# Patient Record
Sex: FEMALE | Race: WHITE | Employment: OTHER | URBAN - METROPOLITAN AREA
[De-identification: names, ages, dates, MRNs, and addresses within clinical notes are randomized per-mention and may not be internally consistent; named-entity substitution may affect disease eponyms.]

---

## 2021-11-13 DIAGNOSIS — M25.562 LEFT KNEE PAIN: Primary | ICD-10-CM

## 2021-11-17 DIAGNOSIS — M25.562 LEFT KNEE PAIN: ICD-10-CM

## 2021-11-19 ENCOUNTER — OFFICE VISIT (OUTPATIENT)
Dept: ORTHOPEDIC SURGERY | Age: 71
End: 2021-11-19
Payer: MEDICARE

## 2021-11-19 VITALS — HEIGHT: 62 IN | BODY MASS INDEX: 30.36 KG/M2 | WEIGHT: 165 LBS

## 2021-11-19 DIAGNOSIS — M22.42 PATELLA, CHONDROMALACIA, LEFT: ICD-10-CM

## 2021-11-19 DIAGNOSIS — M94.262 CHONDROMALACIA OF LEFT KNEE: ICD-10-CM

## 2021-11-19 DIAGNOSIS — M23.42 LOOSE BODY OF LEFT KNEE: ICD-10-CM

## 2021-11-19 DIAGNOSIS — M25.562 CHRONIC PAIN OF LEFT KNEE: Primary | ICD-10-CM

## 2021-11-19 DIAGNOSIS — M17.12 PRIMARY LOCALIZED OSTEOARTHRITIS OF LEFT KNEE: ICD-10-CM

## 2021-11-19 DIAGNOSIS — G89.29 CHRONIC PAIN OF LEFT KNEE: Primary | ICD-10-CM

## 2021-11-19 PROCEDURE — G8400 PT W/DXA NO RESULTS DOC: HCPCS | Performed by: ORTHOPAEDIC SURGERY

## 2021-11-19 PROCEDURE — G8432 DEP SCR NOT DOC, RNG: HCPCS | Performed by: ORTHOPAEDIC SURGERY

## 2021-11-19 PROCEDURE — G8419 CALC BMI OUT NRM PARAM NOF/U: HCPCS | Performed by: ORTHOPAEDIC SURGERY

## 2021-11-19 PROCEDURE — G8536 NO DOC ELDER MAL SCRN: HCPCS | Performed by: ORTHOPAEDIC SURGERY

## 2021-11-19 PROCEDURE — 1090F PRES/ABSN URINE INCON ASSESS: CPT | Performed by: ORTHOPAEDIC SURGERY

## 2021-11-19 PROCEDURE — 99213 OFFICE O/P EST LOW 20 MIN: CPT | Performed by: ORTHOPAEDIC SURGERY

## 2021-11-19 PROCEDURE — 1101F PT FALLS ASSESS-DOCD LE1/YR: CPT | Performed by: ORTHOPAEDIC SURGERY

## 2021-11-19 PROCEDURE — 3017F COLORECTAL CA SCREEN DOC REV: CPT | Performed by: ORTHOPAEDIC SURGERY

## 2021-11-19 PROCEDURE — G8428 CUR MEDS NOT DOCUMENT: HCPCS | Performed by: ORTHOPAEDIC SURGERY

## 2021-11-19 NOTE — PROGRESS NOTES
Nava Obregon (: 1950) is a 79 y.o. female, patient, here for evaluation of the following chief complaint(s):  Knee Pain (left)       HPI:    She was last seen for her left knee pain on 2021. Since then, the patient did have an MRI performed of her left knee at an outside facility. She states that her pain level is the same as was her last visit. She rates the severity of her left knee pain is a 5 out of 10. She describes her pain is dull, aching, and intermittent. Her left knee pain does make it difficult for her to go to sleep and does wake her up from sleep. The patient has been attending formal physical therapy for left knee pain. She has also been working on these exercises with an at-home exercise program.  She has received previous cortisone injection in her left knee in August.    Left knee MRI results:  Evidence of a heterogeneous medial meniscal flap tear and mild extrusion. Moderate-severe chondral loss from the patella and medial femoral condyle. Small joint effusion. 5 mm anterior loose body is lateral to midline. Intact cruciates and collaterals. Not on File    No current outpatient medications on file. No current facility-administered medications for this visit. No past medical history on file. No past surgical history on file. No family history on file.      Social History     Socioeconomic History    Marital status:      Spouse name: Not on file    Number of children: Not on file    Years of education: Not on file    Highest education level: Not on file   Occupational History    Not on file   Tobacco Use    Smoking status: Not on file    Smokeless tobacco: Not on file   Substance and Sexual Activity    Alcohol use: Not on file    Drug use: Not on file    Sexual activity: Not on file   Other Topics Concern    Not on file   Social History Narrative    Not on file     Social Determinants of Health     Financial Resource Strain:    [address] [place] [address] Difficulty of Paying Living Expenses: Not on file   Food Insecurity:     Worried About Running Out of Food in the Last Year: Not on file    Ran Out of Food in the Last Year: Not on file   Transportation Needs:     Lack of Transportation (Medical): Not on file    Lack of Transportation (Non-Medical): Not on file   Physical Activity:     Days of Exercise per Week: Not on file    Minutes of Exercise per Session: Not on file   Stress:     Feeling of Stress : Not on file   Social Connections:     Frequency of Communication with Friends and Family: Not on file    Frequency of Social Gatherings with Friends and Family: Not on file    Attends Druze Services: Not on file    Active Member of [address] [place] [address] or Organizations: Not on file    Attends Club or Organization Meetings: Not on file    Marital Status: Not on file   Intimate Partner Violence:     Fear of Current or Ex-Partner: Not on file    Emotionally Abused: Not on file    Physically Abused: Not on file    Sexually Abused: Not on file   Housing Stability:     Unable to Pay for Housing in the Last Year: Not on file    Number of [place] in the Last Year: Not on file    Unstable Housing in the Last Year: Not on file       Review of Systems   All other systems reviewed and are negative. Vitals:  Ht 5' 2\" (1.575 m)   Wt 165 lb (74.8 kg)   BMI 30.18 kg/m²    Body mass index is 30.18 kg/m². Ortho Exam     Upon physical examination, the patient is well developed, well nourished, alert and oriented times three, with normal mood and affect and walks with a slightly antalgic gait because of her left knee pain. Left knee, the patient is tender to palpation along the medial joint line, and has an effusion. They are nontender to palpation along the medial and lateral facets of the patella. The patient have discomfort with Hazel's maneuvers, and the knee is stable. They have limited range of motion.  They have 5/5 strength, and are neurovascularly intact distally. There is no erythema, warmth or skin lesions present. ASSESSMENT/PLAN:      1. Chronic pain of left knee  2. Primary localized osteoarthritis of left knee  3. Patella, chondromalacia, left  4. Chondromalacia of left knee  5. Loose body of left knee       Below is the assessment and plan developed based on review of pertinent history, physical exam, labs, studies, and medications. We discussed the patient's ongoing left knee pain and we reviewed her MRI images and results and they were consistent with evidence of a heterogeneous medial meniscal flap tear and mild extrusion. Moderate-severe chondral loss from the patella and medial femoral condyle. Small joint effusion. 5 mm anterior loose body is lateral to midline. Intact cruciates and collaterals. The possible treatment options were discussed with the patient and because her pain is secondary to her osteoarthritis she would be an excellent candidate for viscosupplementation injections. The patient was given information on the risks and benefits of the injections and she would like to proceed. We will start the approval process to obtain the injections and contact the patient when her injections arrive. In the interim, she will continue to ice and elevate when possible, modify her activity level based on her left knee pain, and use anti-inflammatory medication when necessary. Patient will also continue to work on range of motion, strengthening, and stretching exercises with an at-home exercise program as pain tolerates. She is to avoid any deep knee bend activities against resistance, squatting, kneeling, stairs, lunging, cutting, twisting, change in direction, and high impact loading activities. I will see her back on an as-needed basis for her left knee pain or as noted above when her injections were derived. Return if symptoms worsen or fail to improve, for When her viscosupplementation injections arrive. .    An electronic signature was used to authenticate this note.   -- Kerry Brain

## 2021-12-03 ENCOUNTER — OFFICE VISIT (OUTPATIENT)
Dept: ORTHOPEDIC SURGERY | Age: 71
End: 2021-12-03
Payer: MEDICARE

## 2021-12-03 VITALS — HEIGHT: 62 IN | BODY MASS INDEX: 29.81 KG/M2 | WEIGHT: 162 LBS

## 2021-12-03 DIAGNOSIS — G89.29 CHRONIC PAIN OF LEFT KNEE: Primary | ICD-10-CM

## 2021-12-03 DIAGNOSIS — M22.42 PATELLA, CHONDROMALACIA, LEFT: ICD-10-CM

## 2021-12-03 DIAGNOSIS — M25.562 CHRONIC PAIN OF LEFT KNEE: Primary | ICD-10-CM

## 2021-12-03 DIAGNOSIS — M23.42 LOOSE BODY OF LEFT KNEE: ICD-10-CM

## 2021-12-03 DIAGNOSIS — M94.262 CHONDROMALACIA OF LEFT KNEE: ICD-10-CM

## 2021-12-03 DIAGNOSIS — M17.12 PRIMARY LOCALIZED OSTEOARTHRITIS OF LEFT KNEE: ICD-10-CM

## 2021-12-03 PROCEDURE — 1101F PT FALLS ASSESS-DOCD LE1/YR: CPT | Performed by: ORTHOPAEDIC SURGERY

## 2021-12-03 PROCEDURE — G8419 CALC BMI OUT NRM PARAM NOF/U: HCPCS | Performed by: ORTHOPAEDIC SURGERY

## 2021-12-03 PROCEDURE — 3017F COLORECTAL CA SCREEN DOC REV: CPT | Performed by: ORTHOPAEDIC SURGERY

## 2021-12-03 PROCEDURE — G8400 PT W/DXA NO RESULTS DOC: HCPCS | Performed by: ORTHOPAEDIC SURGERY

## 2021-12-03 PROCEDURE — 99214 OFFICE O/P EST MOD 30 MIN: CPT | Performed by: ORTHOPAEDIC SURGERY

## 2021-12-03 PROCEDURE — 1090F PRES/ABSN URINE INCON ASSESS: CPT | Performed by: ORTHOPAEDIC SURGERY

## 2021-12-03 PROCEDURE — G8432 DEP SCR NOT DOC, RNG: HCPCS | Performed by: ORTHOPAEDIC SURGERY

## 2021-12-03 PROCEDURE — 20610 DRAIN/INJ JOINT/BURSA W/O US: CPT | Performed by: ORTHOPAEDIC SURGERY

## 2021-12-03 PROCEDURE — G8536 NO DOC ELDER MAL SCRN: HCPCS | Performed by: ORTHOPAEDIC SURGERY

## 2021-12-03 PROCEDURE — G8427 DOCREV CUR MEDS BY ELIG CLIN: HCPCS | Performed by: ORTHOPAEDIC SURGERY

## 2022-01-07 ENCOUNTER — OFFICE VISIT (OUTPATIENT)
Dept: ORTHOPEDIC SURGERY | Age: 72
End: 2022-01-07
Payer: MEDICARE

## 2022-01-07 VITALS — BODY MASS INDEX: 30.36 KG/M2 | HEIGHT: 62 IN | WEIGHT: 165 LBS

## 2022-01-07 DIAGNOSIS — M94.262 CHONDROMALACIA OF LEFT KNEE: ICD-10-CM

## 2022-01-07 DIAGNOSIS — M25.562 CHRONIC PAIN OF LEFT KNEE: Primary | ICD-10-CM

## 2022-01-07 DIAGNOSIS — M17.12 PRIMARY LOCALIZED OSTEOARTHRITIS OF LEFT KNEE: ICD-10-CM

## 2022-01-07 DIAGNOSIS — M23.42 LOOSE BODY OF LEFT KNEE: ICD-10-CM

## 2022-01-07 DIAGNOSIS — G89.29 CHRONIC PAIN OF LEFT KNEE: Primary | ICD-10-CM

## 2022-01-07 DIAGNOSIS — M22.42 PATELLA, CHONDROMALACIA, LEFT: ICD-10-CM

## 2022-01-07 PROCEDURE — 3017F COLORECTAL CA SCREEN DOC REV: CPT | Performed by: ORTHOPAEDIC SURGERY

## 2022-01-07 PROCEDURE — G8417 CALC BMI ABV UP PARAM F/U: HCPCS | Performed by: ORTHOPAEDIC SURGERY

## 2022-01-07 PROCEDURE — 99214 OFFICE O/P EST MOD 30 MIN: CPT | Performed by: ORTHOPAEDIC SURGERY

## 2022-01-07 PROCEDURE — G8427 DOCREV CUR MEDS BY ELIG CLIN: HCPCS | Performed by: ORTHOPAEDIC SURGERY

## 2022-01-07 PROCEDURE — G8400 PT W/DXA NO RESULTS DOC: HCPCS | Performed by: ORTHOPAEDIC SURGERY

## 2022-01-07 PROCEDURE — 1090F PRES/ABSN URINE INCON ASSESS: CPT | Performed by: ORTHOPAEDIC SURGERY

## 2022-01-07 PROCEDURE — G8536 NO DOC ELDER MAL SCRN: HCPCS | Performed by: ORTHOPAEDIC SURGERY

## 2022-01-07 PROCEDURE — 1101F PT FALLS ASSESS-DOCD LE1/YR: CPT | Performed by: ORTHOPAEDIC SURGERY

## 2022-01-07 PROCEDURE — G8432 DEP SCR NOT DOC, RNG: HCPCS | Performed by: ORTHOPAEDIC SURGERY

## 2022-01-07 NOTE — PROGRESS NOTES
Jaime Hamlin (: 1950) is a 70 y.o. female, patient, here for evaluation of the following chief complaint(s):  Knee Pain (left)       HPI:    She last seen for left knee pain on 12/3/2021. The patient states that her pain level is the same as it was at her last visit. She rates the severity of her left knee pain as a 5 or 6 out of 10. She describes her pain is aching and intermittent. Her left knee pain does occasionally make it difficult for her to go to sleep and does wake her up from sleep. She has been taking Advil for her discomfort as needed. She did have her knee injected with viscosupplementation at her last visit. She did also have an MRI performed on her left knee prior to her last visit which was again reviewed today. Left knee MRI results:  Evidence of a heterogeneous medial meniscal flap tear and mild extrusion. Moderate-severe chondral loss from the patella and medial femoral condyle. Small joint effusion. 5 mm anterior loose body is lateral to midline. Intact cruciates and collaterals. Not on File    No current outpatient medications on file. No current facility-administered medications for this visit. History reviewed. No pertinent past medical history. History reviewed. No pertinent surgical history. History reviewed. No pertinent family history.      Social History     Socioeconomic History    Marital status:      Spouse name: Not on file    Number of children: Not on file    Years of education: Not on file    Highest education level: Not on file   Occupational History    Not on file   Tobacco Use    Smoking status: Not on file    Smokeless tobacco: Not on file   Substance and Sexual Activity    Alcohol use: Not on file    Drug use: Not on file    Sexual activity: Not on file   Other Topics Concern    Not on file   Social History Narrative    Not on file     Social Determinants of Health     Financial Resource Strain:     Difficulty of Paying Living Expenses: Not on file   Food Insecurity:     Worried About Running Out of Food in the Last Year: Not on file    Ran Out of Food in the Last Year: Not on file   Transportation Needs:     Lack of Transportation (Medical): Not on file    Lack of Transportation (Non-Medical): Not on file   Physical Activity:     Days of Exercise per Week: Not on file    Minutes of Exercise per Session: Not on file   Stress:     Feeling of Stress : Not on file   Social Connections:     Frequency of Communication with Friends and Family: Not on file    Frequency of Social Gatherings with Friends and Family: Not on file    Attends Church Services: Not on file    Active Member of [address] [place] [address] [place] or Organizations: Not on file    Attends Club or Organization Meetings: Not on file    Marital Status: Not on file   Intimate Partner Violence:     Fear of Current or Ex-Partner: Not on file    Emotionally Abused: Not on file    Physically Abused: Not on file    Sexually Abused: Not on file   Housing Stability:     Unable to Pay for Housing in the Last Year: Not on file    Number of [place] in the Last Year: Not on file    Unstable Housing in the Last Year: Not on file       Review of Systems   All other systems reviewed and are negative. Vitals:  Ht 5' 2\" (1.575 m)   Wt 165 lb (74.8 kg)   BMI 30.18 kg/m²    Body mass index is 30.18 kg/m². Ortho Exam     The patient is well-developed and well-nourished. The patient presents today in alert and oriented x3 with a normal mood and affect. The patient stands with a normal weightbearing line but walks with a slightly antalgic gait because of her left knee pain.     Left knee, the patient is tender to palpation along the medial joint line, and has an effusion. She is also tender to palpation along the medial and lateral facets of the patella. The patient have discomfort with Hazel's maneuvers, and the knee is stable. They have limited range of motion.  They have 5/5 strength, and are neurovascularly intact distally. There is no erythema, warmth or skin lesions present. ASSESSMENT/PLAN:      1. Chronic pain of left knee  2. Primary localized osteoarthritis of left knee  3. Patella, chondromalacia, left  4. Chondromalacia of left knee  5. Loose body of left knee       Below is the assessment and plan developed based on review of pertinent history, physical exam, labs, studies, and medications. We discussed the patient's ongoing left knee pain and we again reviewed her MRI images and results and they were consistent with evidence of a heterogeneous medial meniscal flap tear and mild extrusion. Moderate-severe chondral loss from the patella and medial femoral condyle. Small joint effusion. 5 mm anterior loose body is lateral to midline. Intact cruciates and collaterals. The possible treatment options were discussed with the patient and because of the duration of her increased pain, no improvement with multiple modalities of conservative management including an at-home exercise program and previous viscosupplementation injection, her physical exam, past x-rays, MRI images and results, and her inability to complete daily living activities without significant discomfort we both decided that surgical intervention was the best treatment plan. The risks and benefits of a left knee arthroscopic exam with chondroplasty and removal of loose body were discussed in detail with the patient and she would like to proceed. We will schedule this at her convenience. In the interim, I did encourage her to ice and elevate when possible, modify her activity level based on her left knee pain, and use anti-inflammatory medication when necessary. The patient will also work on range of motion, strengthening, and stretching exercises with an at-home exercise program as pain tolerates.   She is to avoid any deep knee bend activities against resistance, squatting, kneeling, stairs, lunging, cutting, twisting, change of direction, and high impact loading activities. I will see her back in the office on an as-needed basis or on the day of her left knee surgery. Return for In the office as needed or on the day of her left knee surgery. An electronic signature was used to authenticate this note.   -- Cristal Encinas MD

## 2022-07-25 ENCOUNTER — HOSPITAL ENCOUNTER (EMERGENCY)
Age: 72
Discharge: HOME | End: 2022-07-25
Attending: STUDENT IN AN ORGANIZED HEALTH CARE EDUCATION/TRAINING PROGRAM
Payer: MEDICARE

## 2022-07-25 VITALS
RESPIRATION RATE: 16 BRPM | TEMPERATURE: 97.8 F | HEART RATE: 78 BPM | OXYGEN SATURATION: 98 % | DIASTOLIC BLOOD PRESSURE: 80 MMHG | SYSTOLIC BLOOD PRESSURE: 140 MMHG

## 2022-07-25 DIAGNOSIS — L25.8 CONTACT DERMATITIS DUE TO OTHER AGENT, UNSPECIFIED CONTACT DERMATITIS TYPE: Primary | ICD-10-CM

## 2022-07-25 PROCEDURE — 99283 EMERGENCY DEPT VISIT LOW MDM: CPT

## 2022-07-25 RX ORDER — BETAMETHASONE DIPROPIONATE 0.5 MG/G
CREAM TOPICAL
COMMUNITY
Start: 2022-07-21

## 2022-07-25 RX ORDER — PREDNISONE 10 MG/1
TABLET ORAL
Qty: 48 TABLET | Refills: 0 | Status: SHIPPED | OUTPATIENT
Start: 2022-07-25

## 2022-07-25 RX ORDER — RAMIPRIL 10 MG/1
CAPSULE ORAL
COMMUNITY
Start: 2022-07-13

## 2022-07-25 RX ORDER — CHLORTHALIDONE 25 MG/1
TABLET ORAL
COMMUNITY
Start: 2022-02-01

## 2022-07-25 NOTE — ED TRIAGE NOTES
Patient arrives ambulatory to ed with complaints of rash since last thursday. Rash started on the back of her neck and now has spread all over her body. Patient states she gets poison ivy ever year, however never this bad. Patient has been using betamethasone cream, hydrocortisone cream and benadryl without relief.

## 2022-07-26 NOTE — ED PROVIDER NOTES
66-year-old female presents today with a rash. It started after she worked in her yard weeding. It started on the back of her neck and is since become diffuse. No lesions within the mouth, eye irritation or involvement of the palms or soles of her hands and feet. She tried topical steroids without improvement. Symptoms have been going on for several days now and not improving. She is allergic to poison ivy and poison oak. No other symptoms such as difficulty breathing, trouble swallowing, fevers. Past Medical History:   Diagnosis Date    Hypertension        History reviewed. No pertinent surgical history. History reviewed. No pertinent family history. Social History     Socioeconomic History    Marital status:      Spouse name: Not on file    Number of children: Not on file    Years of education: Not on file    Highest education level: Not on file   Occupational History    Not on file   Tobacco Use    Smoking status: Never    Smokeless tobacco: Never   Substance and Sexual Activity    Alcohol use: Not Currently    Drug use: Never    Sexual activity: Not on file   Other Topics Concern    Not on file   Social History Narrative    Not on file     Social Determinants of Health     Financial Resource Strain: Not on file   Food Insecurity: Not on file   Transportation Needs: Not on file   Physical Activity: Not on file   Stress: Not on file   Social Connections: Not on file   Intimate Partner Violence: Not on file   Housing Stability: Not on file         ALLERGIES: Sporanox [itraconazole]    Review of Systems   Constitutional:  Negative for chills and fever. HENT:  Negative for congestion and rhinorrhea. Eyes:  Negative for redness and visual disturbance. Respiratory:  Negative for cough and shortness of breath. Cardiovascular:  Negative for chest pain and leg swelling. Gastrointestinal:  Negative for abdominal pain, diarrhea, nausea and vomiting.    Genitourinary:  Negative for dysuria, flank pain, frequency, hematuria and urgency. Musculoskeletal:  Negative for arthralgias, back pain, myalgias and neck pain. Skin:  Positive for rash. Negative for wound. Allergic/Immunologic: Negative for immunocompromised state. Neurological:  Negative for dizziness and headaches. Vitals:    07/25/22 1525 07/25/22 1555   BP: 137/83 (!) 140/80   Pulse: 78    Resp: 16    Temp: 97.8 °F (36.6 °C)    SpO2: 100% 98%            Physical Exam  Constitutional:       General: She is not in acute distress. Appearance: She is well-developed. HENT:      Head: Normocephalic. Eyes:      Conjunctiva/sclera: Conjunctivae normal.   Pulmonary:      Effort: Pulmonary effort is normal. No respiratory distress. Musculoskeletal:         General: Normal range of motion. Cervical back: Normal range of motion. Skin:     General: Skin is warm and dry. Capillary Refill: Capillary refill takes less than 2 seconds. Comments: Scattered rash to the back of the neck, torso, arms consistent with a contact dermatitis. Lesions have started to coalesce on the bilateral flanks. No drainage. Psychiatric:         Behavior: Behavior normal.        MDM  61-year-old female presents today with what appears to be a contact dermatitis, likely related to poison ivy or poison oak as it started after working in the yard. Has failed topical steroids and Benadryl. I will prescribe her oral prednisone given the diffuse nature of the rash and the duration of symptoms. No evidence of cellulitis at this time. She is afebrile and in no acute distress. She is okay for discharge home with strict return precautions. ICD-10-CM ICD-9-CM    1.  Contact dermatitis due to other agent, unspecified contact dermatitis type  L25.8 692.89         CRISTINE Kimball, DO           Procedures

## 2023-03-05 ENCOUNTER — HOSPITAL ENCOUNTER (INPATIENT)
Age: 73
LOS: 1 days | Discharge: HOME | DRG: 069 | End: 2023-03-06
Attending: EMERGENCY MEDICINE | Admitting: FAMILY MEDICINE
Payer: MEDICARE

## 2023-03-05 ENCOUNTER — APPOINTMENT (OUTPATIENT)
Dept: CT IMAGING | Age: 73
DRG: 069 | End: 2023-03-05
Attending: EMERGENCY MEDICINE
Payer: MEDICARE

## 2023-03-05 DIAGNOSIS — R94.31 PROLONGED Q-T INTERVAL ON ECG: ICD-10-CM

## 2023-03-05 DIAGNOSIS — R00.1 SYMPTOMATIC BRADYCARDIA: Primary | ICD-10-CM

## 2023-03-05 DIAGNOSIS — R42 DIZZINESS: ICD-10-CM

## 2023-03-05 DIAGNOSIS — R77.8 ELEVATED TROPONIN: ICD-10-CM

## 2023-03-05 LAB
ANION GAP SERPL CALC-SCNC: 9 MMOL/L (ref 5–15)
ATRIAL RATE: 59 BPM
BASOPHILS # BLD: 0.1 K/UL (ref 0–0.1)
BASOPHILS NFR BLD: 1 % (ref 0–1)
BUN SERPL-MCNC: 23 MG/DL (ref 6–20)
BUN/CREAT SERPL: 32 (ref 12–20)
CALCIUM SERPL-MCNC: 8.8 MG/DL (ref 8.5–10.1)
CALCULATED P AXIS, ECG09: 72 DEGREES
CALCULATED R AXIS, ECG10: -5 DEGREES
CALCULATED T AXIS, ECG11: -22 DEGREES
CHLORIDE SERPL-SCNC: 107 MMOL/L (ref 97–108)
CO2 SERPL-SCNC: 29 MMOL/L (ref 21–32)
CREAT SERPL-MCNC: 0.73 MG/DL (ref 0.55–1.02)
DIAGNOSIS, 93000: NORMAL
DIFFERENTIAL METHOD BLD: ABNORMAL
EOSINOPHIL # BLD: 0.3 K/UL (ref 0–0.4)
EOSINOPHIL NFR BLD: 3 % (ref 0–7)
ERYTHROCYTE [DISTWIDTH] IN BLOOD BY AUTOMATED COUNT: 17.9 % (ref 11.5–14.5)
GLUCOSE SERPL-MCNC: 128 MG/DL (ref 65–100)
HCT VFR BLD AUTO: 35.3 % (ref 35–47)
HGB BLD-MCNC: 10.7 G/DL (ref 11.5–16)
IMM GRANULOCYTES # BLD AUTO: 0.1 K/UL (ref 0–0.04)
IMM GRANULOCYTES NFR BLD AUTO: 1 % (ref 0–0.5)
LYMPHOCYTES # BLD: 1.6 K/UL (ref 0.8–3.5)
LYMPHOCYTES NFR BLD: 19 % (ref 12–49)
MCH RBC QN AUTO: 18.6 PG (ref 26–34)
MCHC RBC AUTO-ENTMCNC: 30.3 G/DL (ref 30–36.5)
MCV RBC AUTO: 61.4 FL (ref 80–99)
MONOCYTES # BLD: 0.7 K/UL (ref 0–1)
MONOCYTES NFR BLD: 8 % (ref 5–13)
NEUTS SEG # BLD: 5.5 K/UL (ref 1.8–8)
NEUTS SEG NFR BLD: 68 % (ref 32–75)
NRBC # BLD: 0 K/UL (ref 0–0.01)
NRBC BLD-RTO: 0 PER 100 WBC
P-R INTERVAL, ECG05: 146 MS
PLATELET # BLD AUTO: 223 K/UL (ref 150–400)
POTASSIUM SERPL-SCNC: 3.5 MMOL/L (ref 3.5–5.1)
Q-T INTERVAL, ECG07: 552 MS
QRS DURATION, ECG06: 134 MS
QTC CALCULATION (BEZET), ECG08: 546 MS
RBC # BLD AUTO: 5.75 M/UL (ref 3.8–5.2)
RBC MORPH BLD: ABNORMAL
RBC MORPH BLD: ABNORMAL
SODIUM SERPL-SCNC: 145 MMOL/L (ref 136–145)
TROPONIN I SERPL HS-MCNC: 112 NG/L (ref 0–51)
VENTRICULAR RATE, ECG03: 59 BPM
WBC # BLD AUTO: 8.3 K/UL (ref 3.6–11)

## 2023-03-05 PROCEDURE — 36415 COLL VENOUS BLD VENIPUNCTURE: CPT

## 2023-03-05 PROCEDURE — 74011000636 HC RX REV CODE- 636: Performed by: EMERGENCY MEDICINE

## 2023-03-05 PROCEDURE — 70450 CT HEAD/BRAIN W/O DYE: CPT

## 2023-03-05 PROCEDURE — 74011250637 HC RX REV CODE- 250/637: Performed by: EMERGENCY MEDICINE

## 2023-03-05 PROCEDURE — 99285 EMERGENCY DEPT VISIT HI MDM: CPT

## 2023-03-05 PROCEDURE — 84484 ASSAY OF TROPONIN QUANT: CPT

## 2023-03-05 PROCEDURE — 93005 ELECTROCARDIOGRAM TRACING: CPT

## 2023-03-05 PROCEDURE — 70496 CT ANGIOGRAPHY HEAD: CPT

## 2023-03-05 PROCEDURE — 65270000046 HC RM TELEMETRY

## 2023-03-05 PROCEDURE — 80048 BASIC METABOLIC PNL TOTAL CA: CPT

## 2023-03-05 PROCEDURE — 85025 COMPLETE CBC W/AUTO DIFF WBC: CPT

## 2023-03-05 RX ORDER — LISINOPRIL 20 MG/1
40 TABLET ORAL DAILY
Status: DISCONTINUED | OUTPATIENT
Start: 2023-03-06 | End: 2023-03-06 | Stop reason: HOSPADM

## 2023-03-05 RX ORDER — CALCIUM CARBONATE/VITAMIN D3 600 MG-125
TABLET ORAL
COMMUNITY

## 2023-03-05 RX ORDER — ACETAMINOPHEN 650 MG/1
650 SUPPOSITORY RECTAL
Status: DISCONTINUED | OUTPATIENT
Start: 2023-03-05 | End: 2023-03-06 | Stop reason: HOSPADM

## 2023-03-05 RX ORDER — ASPIRIN 325 MG
325 TABLET ORAL
Status: COMPLETED | OUTPATIENT
Start: 2023-03-05 | End: 2023-03-05

## 2023-03-05 RX ORDER — FERROUS SULFATE, DRIED 160(50) MG
1 TABLET, EXTENDED RELEASE ORAL DAILY
Status: DISCONTINUED | OUTPATIENT
Start: 2023-03-06 | End: 2023-03-06 | Stop reason: HOSPADM

## 2023-03-05 RX ORDER — SODIUM CHLORIDE 0.9 % (FLUSH) 0.9 %
5-40 SYRINGE (ML) INJECTION AS NEEDED
Status: DISCONTINUED | OUTPATIENT
Start: 2023-03-05 | End: 2023-03-06 | Stop reason: HOSPADM

## 2023-03-05 RX ORDER — ACETAMINOPHEN 325 MG/1
650 TABLET ORAL
Status: DISCONTINUED | OUTPATIENT
Start: 2023-03-05 | End: 2023-03-06 | Stop reason: HOSPADM

## 2023-03-05 RX ORDER — ONDANSETRON 4 MG/1
4 TABLET, ORALLY DISINTEGRATING ORAL
Status: DISCONTINUED | OUTPATIENT
Start: 2023-03-05 | End: 2023-03-06 | Stop reason: HOSPADM

## 2023-03-05 RX ORDER — POLYETHYLENE GLYCOL 3350 17 G/17G
17 POWDER, FOR SOLUTION ORAL DAILY PRN
Status: DISCONTINUED | OUTPATIENT
Start: 2023-03-05 | End: 2023-03-06 | Stop reason: HOSPADM

## 2023-03-05 RX ORDER — CLOBETASOL PROPIONATE 0.5 MG/G
CREAM TOPICAL 2 TIMES DAILY
COMMUNITY

## 2023-03-05 RX ORDER — ONDANSETRON 2 MG/ML
4 INJECTION INTRAMUSCULAR; INTRAVENOUS
Status: DISCONTINUED | OUTPATIENT
Start: 2023-03-05 | End: 2023-03-06 | Stop reason: HOSPADM

## 2023-03-05 RX ORDER — SODIUM CHLORIDE 0.9 % (FLUSH) 0.9 %
5-40 SYRINGE (ML) INJECTION EVERY 8 HOURS
Status: DISCONTINUED | OUTPATIENT
Start: 2023-03-06 | End: 2023-03-06 | Stop reason: HOSPADM

## 2023-03-05 RX ADMIN — IOPAMIDOL 100 ML: 755 INJECTION, SOLUTION INTRAVENOUS at 09:25

## 2023-03-05 RX ADMIN — ASPIRIN 325 MG ORAL TABLET 325 MG: 325 PILL ORAL at 10:33

## 2023-03-05 NOTE — ED TRIAGE NOTES
Patient presents to the ER with chief complaint of dizziness and vomiting onset this morning upon waking up. Patient states describes the dizziness as \"the room is spinning\" when she stood up. Denies syncopal episode. Patient drank coffee and vomited afterwards.

## 2023-03-05 NOTE — ED PROVIDER NOTES
72F w/ hx HTN p/w 2hrs dizziness. Pt reports 2hrs of room spinning dizziness that started gradually and has been persistent. First noticed dizziness when she woke up and then worsened after she sat up and walked around her house. Notes feeling very unbalanced and unsteady. Had nausea and one episode of vomiting. Symptoms now improved lying still but mildly present. Denies any head/neck pain. No chest/abd pain, dyspnea, F/C, cough, diarrhea, rectal bleeding or urinary symptoms. Pt notes that has never had these symptoms before or any hx of vertigo. No speech/vision changes, focal ext weakness/numbness, syncope or seizure. No drugs/etoh. No new or changes to medications. Was feeling well when went to bed last night after eating dinner. Past Medical History:   Diagnosis Date    Hypertension     Thalassemia        History reviewed. No pertinent surgical history. History reviewed. No pertinent family history. Social History     Socioeconomic History    Marital status:      Spouse name: Not on file    Number of children: Not on file    Years of education: Not on file    Highest education level: Not on file   Occupational History    Not on file   Tobacco Use    Smoking status: Never    Smokeless tobacco: Never   Substance and Sexual Activity    Alcohol use: Not Currently    Drug use: Never    Sexual activity: Not on file   Other Topics Concern    Not on file   Social History Narrative    Not on file     Social Determinants of Health     Financial Resource Strain: Not on file   Food Insecurity: Not on file   Transportation Needs: Not on file   Physical Activity: Not on file   Stress: Not on file   Social Connections: Not on file   Intimate Partner Violence: Not on file   Housing Stability: Not on file         ALLERGIES: Sporanox [itraconazole]    Review of Systems   Constitutional:  Negative for chills, diaphoresis and fever.    HENT:  Negative for facial swelling, mouth sores, nosebleeds, trouble swallowing and voice change. Eyes:  Negative for pain and visual disturbance. Respiratory:  Negative for apnea, cough, choking, shortness of breath, wheezing and stridor. Cardiovascular:  Negative for chest pain, palpitations and leg swelling. Gastrointestinal:  Negative for abdominal distention, abdominal pain, blood in stool, diarrhea, nausea and vomiting. Genitourinary:  Negative for difficulty urinating, dysuria, flank pain, hematuria and pelvic pain. Musculoskeletal:  Negative for joint swelling. Skin:  Negative for color change and rash. Allergic/Immunologic: Negative for immunocompromised state. Neurological:  Positive for dizziness. Negative for seizures, syncope, speech difficulty and light-headedness. Hematological:  Does not bruise/bleed easily. Psychiatric/Behavioral:  Negative for agitation and behavioral problems. Vitals:    03/06/23 0945 03/06/23 1000 03/06/23 1200 03/06/23 1400   BP: 131/77      Pulse: 66 76 64 66   Resp: 14      Temp: 97.5 °F (36.4 °C)      SpO2: 97%      Weight:       Height:                Physical Exam  Vitals and nursing note reviewed. Constitutional:       General: She is not in acute distress. Appearance: Normal appearance. She is not ill-appearing or toxic-appearing. HENT:      Head: Normocephalic and atraumatic. Right Ear: External ear normal.      Left Ear: External ear normal.      Nose: Nose normal.      Mouth/Throat:      Mouth: Mucous membranes are moist.      Pharynx: Oropharynx is clear. No oropharyngeal exudate or posterior oropharyngeal erythema. Eyes:      General: No scleral icterus. Extraocular Movements: Extraocular movements intact. Conjunctiva/sclera: Conjunctivae normal.      Pupils: Pupils are equal, round, and reactive to light. Cardiovascular:      Rate and Rhythm: Normal rate and regular rhythm. Pulses: Normal pulses. Heart sounds: Normal heart sounds. No murmur heard. No friction rub.  No gallop. Pulmonary:      Effort: Pulmonary effort is normal. No respiratory distress. Breath sounds: Normal breath sounds. No stridor. No wheezing, rhonchi or rales. Abdominal:      General: There is no distension. Palpations: Abdomen is soft. Tenderness: There is no abdominal tenderness. There is no guarding or rebound. Musculoskeletal:         General: No tenderness or deformity. Normal range of motion. Cervical back: Normal range of motion and neck supple. No rigidity. Right lower leg: No edema. Left lower leg: No edema. Skin:     General: Skin is warm. Capillary Refill: Capillary refill takes less than 2 seconds. Coloration: Skin is not jaundiced. Neurological:      General: No focal deficit present. Mental Status: She is alert. Cranial Nerves: No cranial nerve deficit. Sensory: No sensory deficit. Motor: No weakness. Coordination: Coordination normal.      Comments: -GCS15, AAox3  -Normal b/l UE/LE motor/sensory exam  -CN2-12 intact including able to smile/frown, elevate eyebrows symmetrically, close eyes tightly against force, sensation to light touch intact V1-V3 distribution, hearing intact to finger rub b/l, palate/uvula elevate midline/symmetrically, able to shrug shoulders and move head left/right against force, tongue protrudes midline  -EOMI, PERRL, no nystagmus, normal visual fields, nl speech w/o dysarthria/aphasia  -no pronator drift  -cerebellar testing intact including rapid/alternating movements, finger-to-nose/shin-to-heel  -normal gait, no ataxia, negative rhomberg     Psychiatric:         Mood and Affect: Mood normal.         Behavior: Behavior normal.         Thought Content: Thought content normal.         Judgment: Judgment normal.      I personally reviewed and independently interpreted EKG, labs and imaging results.     EKG Interpretation   SR, wide QRS, RBBB, prolonged QTc, no NASREEN/STD/TWI    LABORATORY TESTS:  Admission on 03/05/2023, Discharged on 03/06/2023   Component Date Value Ref Range Status    Ventricular Rate 03/05/2023 59  BPM Final    Atrial Rate 03/05/2023 59  BPM Final    P-R Interval 03/05/2023 146  ms Final    QRS Duration 03/05/2023 134  ms Final    Q-T Interval 03/05/2023 552  ms Final    QTC Calculation (Bezet) 03/05/2023 546  ms Final    Calculated P Axis 03/05/2023 72  degrees Final    Calculated R Axis 03/05/2023 -5  degrees Final    Calculated T Axis 03/05/2023 -22  degrees Final    Diagnosis 03/05/2023    Final                    Value:Sinus bradycardia  Right bundle branch block  Nonspecific ST abnormality  No previous ECGs available  Confirmed by Rosa Gauthier ([id number]) on 3/5/2023 2:53:54 PM      WBC 03/05/2023 8.3  3.6 - 11.0 K/uL Final    RBC 03/05/2023 5.75 (A)  3.80 - 5.20 M/uL Final    HGB 03/05/2023 10.7 (A)  11.5 - 16.0 g/dL Final    HCT 03/05/2023 35.3  35.0 - 47.0 % Final    MCV 03/05/2023 61.4 (A)  80.0 - 99.0 FL Final    MCH 03/05/2023 18.6 (A)  26.0 - 34.0 PG Final    MCHC 03/05/2023 30.3  30.0 - 36.5 g/dL Final    RDW 03/05/2023 17.9 (A)  11.5 - 14.5 % Final    PLATELET 64/98/0523 375  150 - 400 K/uL Final    RESULTS REPEATED    NRBC 03/05/2023 0.0  0  WBC Final    ABSOLUTE NRBC 03/05/2023 0.00  0.00 - 0.01 K/uL Final    NEUTROPHILS 03/05/2023 68  32 - 75 % Final    LYMPHOCYTES 03/05/2023 19  12 - 49 % Final    MONOCYTES 03/05/2023 8  5 - 13 % Final    EOSINOPHILS 03/05/2023 3  0 - 7 % Final    BASOPHILS 03/05/2023 1  0 - 1 % Final    IMMATURE GRANULOCYTES 03/05/2023 1 (A)  0.0 - 0.5 % Final    ABS. NEUTROPHILS 03/05/2023 5.5  1.8 - 8.0 K/UL Final    ABS. LYMPHOCYTES 03/05/2023 1.6  0.8 - 3.5 K/UL Final    ABS. MONOCYTES 03/05/2023 0.7  0.0 - 1.0 K/UL Final    ABS. EOSINOPHILS 03/05/2023 0.3  0.0 - 0.4 K/UL Final    ABS. BASOPHILS 03/05/2023 0.1  0.0 - 0.1 K/UL Final    ABS. IMM.  GRANS. 03/05/2023 0.1 (A)  0.00 - 0.04 K/UL Final    DF 03/05/2023 SMEAR SCANNED    Final RBC COMMENTS 03/05/2023     Final                    Value:ANISOCYTOSIS  1+      RBC COMMENTS 03/05/2023     Final                    Value:MICROCYTOSIS  2+      Sodium 03/05/2023 145  136 - 145 mmol/L Final    Potassium 03/05/2023 3.5  3.5 - 5.1 mmol/L Final    Chloride 03/05/2023 107  97 - 108 mmol/L Final    CO2 03/05/2023 29  21 - 32 mmol/L Final    Anion gap 03/05/2023 9  5 - 15 mmol/L Final    Glucose 03/05/2023 128 (A)  65 - 100 mg/dL Final    BUN 03/05/2023 23 (A)  6 - 20 MG/DL Final    Creatinine 03/05/2023 0.73  0.55 - 1.02 MG/DL Final    BUN/Creatinine ratio 03/05/2023 32 (A)  12 - 20   Final    eGFR 03/05/2023 >60  >60 ml/min/1.73m2 Final    Comment:      Pediatric calculator link: [email or web address] [email or web address]_[email or web address]       These results are not intended for use in patients <25years of age. eGFR results are calculated without a race factor using  the 2021 CKD-EPI equation. Careful clinical correlation is recommended, particularly when comparing to results calculated using previous equations. The CKD-EPI equation is less accurate in patients with extremes of muscle mass, extra-renal metabolism of creatinine, excessive creatine ingestion, or following therapy that affects renal tubular secretion. Calcium 03/05/2023 8.8  8.5 - 10.1 MG/DL Final    Troponin-High Sensitivity 03/05/2023 112 (A)  0 - 51 ng/L Final    Comment: A HS troponin value change of (+ or -) 50% or more below the 99th percentile, in a 1/2/3 hr interval represents a significant change. Clinical correlation is recommended. A HS troponin value change of (+ or -) 20% or above the 99th percentile, in a 1/2/3 hr interval represents a significant change. Clinical correlation is recommended.   99th Percentile:   Women: 0-51 ng/L                                                                Men:   0-76 ng/L  Patients taking more than 20 mg/day of biotin may have falsely negative results and should not use this test.      Sodium 03/06/2023 140  136 - 145 mmol/L Final    Potassium 03/06/2023 3.2 (A)  3.5 - 5.1 mmol/L Final    Chloride 03/06/2023 106  97 - 108 mmol/L Final    CO2 03/06/2023 28  21 - 32 mmol/L Final    Anion gap 03/06/2023 6  5 - 15 mmol/L Final    Glucose 03/06/2023 120 (A)  65 - 100 mg/dL Final    BUN 03/06/2023 13  6 - 20 MG/DL Final    Creatinine 03/06/2023 0.74  0.55 - 1.02 MG/DL Final    BUN/Creatinine ratio 03/06/2023 18  12 - 20   Final    eGFR 03/06/2023 >60  >60 ml/min/1.73m2 Final    Comment:      Pediatric calculator link: [employer].[email or web address] [email or web address]/gfr_calculatorped       These results are not intended for use in patients <25years of age. eGFR results are calculated without a race factor using  the 2021 CKD-EPI equation. Careful clinical correlation is recommended, particularly when comparing to results calculated using previous equations. The CKD-EPI equation is less accurate in patients with extremes of muscle mass, extra-renal metabolism of creatinine, excessive creatine ingestion, or following therapy that affects renal tubular secretion. Calcium 03/06/2023 9.5  8.5 - 10.1 MG/DL Final    Bilirubin, total 03/06/2023 0.7  0.2 - 1.0 MG/DL Final    ALT (SGPT) 03/06/2023 26  12 - 78 U/L Final    AST (SGOT) 03/06/2023 16  15 - 37 U/L Final    Alk.  phosphatase 03/06/2023 64  45 - 117 U/L Final    Protein, total 03/06/2023 7.2  6.4 - 8.2 g/dL Final    Albumin 03/06/2023 3.7  3.5 - 5.0 g/dL Final    Globulin 03/06/2023 3.5  2.0 - 4.0 g/dL Final    A-G Ratio 03/06/2023 1.1  1.1 - 2.2   Final    Magnesium 03/06/2023 2.2  1.6 - 2.4 mg/dL Final    WBC 03/06/2023 8.8  3.6 - 11.0 K/uL Final    RBC 03/06/2023 6.19 (A)  3.80 - 5.20 M/uL Final    HGB 03/06/2023 11.4 (A)  11.5 - 16.0 g/dL Final    HCT 03/06/2023 37.8  35.0 - 47.0 % Final    MCV 03/06/2023 61.1 (A)  80.0 - 99.0 FL Final    MCH 03/06/2023 18.4 (A)  26.0 - 34.0 PG Final    MCHC 03/06/2023 30.2  30.0 - 36.5 g/dL Final RDW 03/06/2023 18.1 (A)  11.5 - 14.5 % Final    PLATELET 46/79/8856 161  150 - 400 K/uL Final    NRBC 03/06/2023 0.0  0  WBC Final    ABSOLUTE NRBC 03/06/2023 0.00  0.00 - 0.01 K/uL Final    NEUTROPHILS 03/06/2023 75  32 - 75 % Final    LYMPHOCYTES 03/06/2023 18  12 - 49 % Final    MONOCYTES 03/06/2023 4 (A)  5 - 13 % Final    EOSINOPHILS 03/06/2023 2  0 - 7 % Final    BASOPHILS 03/06/2023 1  0 - 1 % Final    IMMATURE GRANULOCYTES 03/06/2023 0  0.0 - 0.5 % Final    ABS. NEUTROPHILS 03/06/2023 6.5  1.8 - 8.0 K/UL Final    ABS. LYMPHOCYTES 03/06/2023 1.6  0.8 - 3.5 K/UL Final    ABS. MONOCYTES 03/06/2023 0.4  0.0 - 1.0 K/UL Final    ABS. EOSINOPHILS 03/06/2023 0.2  0.0 - 0.4 K/UL Final    ABS. BASOPHILS 03/06/2023 0.1  0.0 - 0.1 K/UL Final    ABS. IMM. GRANS. 03/06/2023 0.0  0.00 - 0.04 K/UL Final    DF 03/06/2023 SMEAR SCANNED    Final    RBC COMMENTS 03/06/2023     Final                    Value:ANISOCYTOSIS  1+      RBC COMMENTS 03/06/2023     Final                    Value:HYPOCHROMIA  2+      INR 03/06/2023 1.1  0.9 - 1.1   Final    A single therapeutic range for Vit K antagonists may not be optimal for all indications - see June, 2008 issue of Chest, American College [employer] Evidence-Based Clinical Practice Guidelines, 8th Edition.     Prothrombin time 03/06/2023 11.1  9.0 - 11.1 sec Final    aPTT 03/06/2023 27.9  22.1 - 31.0 sec Final    In addition to factor deficiency, monitoring heparin therapy, etc., evaluation of a prolonged aPTT result should include consideration of preanalytic variables such as heparin flush contamination, specimen integrity issues, etc.    aPTT, therapeutic range 03/06/2023      58.0 - 77.0 SECS Final    IVSd 03/06/2023 0.9  0.6 - 0.9 cm Final    LVIDd 03/06/2023 4.1  3.9 - 5.3 cm Final    LVIDs 03/06/2023 2.6  cm Final    LVOT Diameter 03/06/2023 1.9  cm Final    LVPWd 03/06/2023 1.0 (A)  0.6 - 0.9 cm Final    LVOT Peak Gradient 03/06/2023 4  mmHg Final    LVOT Peak Velocity 03/06/2023 1.0  m/s Final    RVSP 03/06/2023 28  mmHg Final    LA Diameter 03/06/2023 3.7  cm Final    LA Volume A/L 03/06/2023 52  mL Final    LA Volume 2C 03/06/2023 45  22 - 52 mL Final    LA Volume 4C 03/06/2023 48  22 - 52 mL Final    Est. RA Pressure 03/06/2023 3  mmHg Final    AV Area by Peak Velocity 03/06/2023 1.5  cm2 Final    AV Peak Gradient 03/06/2023 14  mmHg Final    AV Peak Velocity 03/06/2023 1.8  m/s Final    MV A Velocity 03/06/2023 0.77  m/s Final    MV E Wave Deceleration Time 03/06/2023 249.8  ms Final    MV E Velocity 03/06/2023 0.94  m/s Final    LV E' Lateral Velocity 03/06/2023 12  cm/s Final    LV E' Septal Velocity 03/06/2023 8  cm/s Final    MV PHT 03/06/2023 72.4  ms Final    MV Area by PHT 03/06/2023 3.0  cm2 Final    MR Peak Gradient 03/06/2023 74  mmHg Final    MR Peak Velocity 03/06/2023 4.3  m/s Final    PV Peak Gradient 03/06/2023 4  mmHg Final    PV Max Velocity 03/06/2023 1.1  m/s Final    TAPSE 03/06/2023 2.5  1.7 cm Final    TR Peak Gradient 03/06/2023 25  mmHg Final    TR Max Velocity 03/06/2023 2.52  m/s Final    Aortic Root 03/06/2023 2.7  cm Final    Fractional Shortening 2D 03/06/2023 37  28 - 44 % Final    LVIDd Index 03/06/2023 2.28  cm/m2 Final    LVIDs Index 03/06/2023 1.44  cm/m2 Final    LV RWT Ratio 03/06/2023 0.49   Final    LV Mass 2D 03/06/2023 123.0  67 - 162 g Final    LV Mass 2D Index 03/06/2023 68.3  43 - 95 g/m2 Final    MV E/A 03/06/2023 1.22   Final    E/E' Ratio (Averaged) 03/06/2023 9.79   Final    E/E' Lateral 03/06/2023 7.83   Final    E/E' Septal 03/06/2023 11.75   Final    LA Volume Index A/L 03/06/2023 29  16 - 34 mL/m2 Final    LVOT Area 03/06/2023 2.8  cm2 Final    LA Volume Index 2C 03/06/2023 25  16 - 34 mL/m2 Final    LA Volume Index 4C 03/06/2023 27  16 - 34 mL/m2 Final    LA Size Index 03/06/2023 2.06  cm/m2 Final    LA/AO Root Ratio 03/06/2023 1.37   Final    Ao Root Index 03/06/2023 1.50  cm/m2 Final    AV Velocity Ratio 03/06/2023 0.56   Final    KASSIDY/BSA Peak Velocity 03/06/2023 0.8  cm2/m2 Final    Ventricular Rate 03/06/2023 61  BPM Final    Atrial Rate 03/06/2023 61  BPM Final    P-R Interval 03/06/2023 176  ms Final    QRS Duration 03/06/2023 134  ms Final    Q-T Interval 03/06/2023 448  ms Final    QTC Calculation (Bezet) 03/06/2023 450  ms Final    Calculated P Axis 03/06/2023 12  degrees Final    Calculated R Axis 03/06/2023 -6  degrees Final    Calculated T Axis 03/06/2023 32  degrees Final    Diagnosis 03/06/2023    Final                    Value:Normal sinus rhythm  Right bundle branch block  When compared with ECG of 05-MAR-2023 08:20,  No significant change  Confirmed by Kimberley Fothergill, M.D., Robyn Medina ([id number]) on 3/7/2023 4:31:54 PM      Troponin-High Sensitivity 03/06/2023 71 (A)  0 - 37 ng/L Final    Comment: A HS troponin value change of (+ or -) 50% or more below the 99th percentile, in a 1/2/3 hr interval represents a significant change. Clinical correlation is recommended. A HS troponin value change of (+ or -) 20% or above the 99th percentile, in a 1/2/3 hr interval represents a significant change. Clinical correlation is recommended. 99th percentile: Women 0-37 ng/L Men 0-57 ng/L  Patients taking more than 20 mg/day of biotin may have falsely negative results and should not use this test.  Method used is Siemens Advia Centaur XPT      TSH 03/06/2023 1.70  0.36 - 3.74 uIU/mL Final    Comment:      Due to TSH heterogeneity, both structurally and degree of glycosylation, monoclonal antibodies used in the TSH assay may not accurately quantitate TSH. Therefore, this result should be correlated with clinical findings as well as with other assessments of thyroid function, e.g., free T4, free T3.       Hemoglobin A1c 03/06/2023 5.4  4.0 - 5.6 % Final    Comment: NEW METHOD  PLEASE NOTE NEW REFERENCE RANGE  (NOTE)  HbA1C Interpretive Ranges  <5.7              Normal  5.7 - 6.4         Consider Prediabetes  >6.5              Consider Diabetes      Est. average glucose 03/06/2023 108  mg/dL Final    Cholesterol, total 03/06/2023 220 (A)  <200 MG/DL Final    Triglyceride 03/06/2023 144  <150 MG/DL Final    Based on NCEP-ATP III:  Triglycerides <150 mg/dL  is considered normal, 150-199 mg/dL  borderline high,  200-499 mg/dL high and  greater than or equal to 500 mg/dL very high. HDL Cholesterol 03/06/2023 53  MG/DL Final    Based on NCEP ATP III, HDL Cholesterol <40 mg/dL is considered low and >60 mg/dL is elevated. LDL, calculated 03/06/2023 138.2 (A)  0 - 100 MG/DL Final    Comment: Based on the NCEP-ATP: LDL-C concentrations are considered  optimal <100 mg/dL,  near optimal/above Normal 100-129 mg/dL  Borderline High: 130-159, High: 160-189 mg/dL  Very High: Greater than or equal to 190 mg/dL      VLDL, calculated 03/06/2023 28.8  MG/DL Final    CHOL/HDL Ratio 03/06/2023 4.2  0.0 - 5.0   Final       IMAGING RESULTS:  MRI BRAIN WO CONT   Final Result   Chronic white matter disease with no acute infarction. CT HEAD WO CONT   Final Result      1. No acute process. No large vessel occlusion, arterial dissection, or   flow-limiting stenosis. 2. CT perfusion not performed. If high clinical concern for acute process,   consider MRI (unless contraindicated). CTA HEAD NECK W CONT   Final Result      1. No acute process. No large vessel occlusion, arterial dissection, or   flow-limiting stenosis. 2. CT perfusion not performed. If high clinical concern for acute process,   consider MRI (unless contraindicated). MEDICATIONS GIVEN:  Medications   iopamidoL (ISOVUE-370) 370 mg iodine /mL (76 %) injection 100 mL (100 mL IntraVENous Given 3/5/23 0925)   aspirin tablet 325 mg (325 mg Oral Given 3/5/23 1033)   potassium chloride SR (KLOR-CON 10) tablet 20 mEq (20 mEq Oral Given 3/6/23 1305)       IMPRESSION:  1. Symptomatic bradycardia    2. Elevated troponin    3.  Prolonged Q-T interval on ECG    4. Dizziness [O96 (ICD-10-CM)]        PLAN:  - Admit to hospitalist    Carly Cannon MD      Medical Decision Making  16J w/ hx HTN p/w 2hrs room spinning dizziness. Pt very pleasant, afebrile, hemodynamically stable w/o resp distress or hypoxia. No focal neuro deficits including normal gait, CN and peripheral exam. Ddx includes CVA (ischemic vs hemorrhagic) vs cerebellar/brainstem stroke vs carotid/vertebral artery occlusion/dissection vs SAH vs ICH/IPH vs SDH/epidural vs HTN emergency/urgency vs PRES vs obstructive hydrocephalus vs intracranial mass vs cerebral edema vs neuromuscular/neurodegenerative disease vs partial/focal seizure vs complex migraine vs syncope vs electrolyte/metabolic vs cardiac. Ordered CTH, CTA head/neck, EKG, labs. Consider MRI brain. Monitor and reassess. 1000 During ED course has been terri high 50s w/ stable BP. No focal neuro deficits. Initially seemed more vertigo but CTH/CTA head/neck which are unremarkable. However likely more cardiac. EKG SR w/ RBBB and prolonged Qtc. Trop elevated 120. Labs otherwise ok. Gave asa. Admitting for symptomatic bradycardia and elevated trop. May also need MRI brain to r/o central vertigo and discussed this w/ admitting team.    Amount and/or Complexity of Data Reviewed  Independent Historian: spouse  Labs: ordered. Decision-making details documented in ED Course. Radiology: ordered and independent interpretation performed. Decision-making details documented in ED Course. ECG/medicine tests: ordered and independent interpretation performed. Decision-making details documented in ED Course. Risk  OTC drugs. Prescription drug management. Decision regarding hospitalization. Procedures        Perfect Serve Consult for Admission  10:04 AM    ED Room Number: 669/01  Patient Name and age:  Mikel Davis 67 y.o.  female  Working Diagnosis:   1. Symptomatic bradycardia    2. Elevated troponin    3.  Prolonged Q-T interval on ECG    4. Dizziness [R42 (ICD-10-CM)] COVID-19 Suspicion:  no  Sepsis present:  no  Reassessment needed: no  Code Status:  Full Code  Readmission: no  Isolation Requirements:  no  Recommended Level of Care:  telemetry  Department:[place] - [phone]

## 2023-03-06 ENCOUNTER — APPOINTMENT (OUTPATIENT)
Dept: NON INVASIVE DIAGNOSTICS | Age: 73
DRG: 069 | End: 2023-03-06
Attending: FAMILY MEDICINE
Payer: MEDICARE

## 2023-03-06 ENCOUNTER — APPOINTMENT (OUTPATIENT)
Dept: MRI IMAGING | Age: 73
DRG: 069 | End: 2023-03-06
Attending: FAMILY MEDICINE
Payer: MEDICARE

## 2023-03-06 VITALS
HEART RATE: 66 BPM | WEIGHT: 173.72 LBS | DIASTOLIC BLOOD PRESSURE: 77 MMHG | RESPIRATION RATE: 14 BRPM | BODY MASS INDEX: 31.97 KG/M2 | HEIGHT: 62 IN | SYSTOLIC BLOOD PRESSURE: 131 MMHG | TEMPERATURE: 97.5 F | OXYGEN SATURATION: 97 %

## 2023-03-06 LAB
ALBUMIN SERPL-MCNC: 3.7 G/DL (ref 3.5–5)
ALBUMIN/GLOB SERPL: 1.1 (ref 1.1–2.2)
ALP SERPL-CCNC: 64 U/L (ref 45–117)
ALT SERPL-CCNC: 26 U/L (ref 12–78)
ANION GAP SERPL CALC-SCNC: 6 MMOL/L (ref 5–15)
APTT PPP: 27.9 SEC (ref 22.1–31)
AST SERPL-CCNC: 16 U/L (ref 15–37)
BASOPHILS # BLD: 0.1 K/UL (ref 0–0.1)
BASOPHILS NFR BLD: 1 % (ref 0–1)
BILIRUB SERPL-MCNC: 0.7 MG/DL (ref 0.2–1)
BUN SERPL-MCNC: 13 MG/DL (ref 6–20)
BUN/CREAT SERPL: 18 (ref 12–20)
CALCIUM SERPL-MCNC: 9.5 MG/DL (ref 8.5–10.1)
CHLORIDE SERPL-SCNC: 106 MMOL/L (ref 97–108)
CHOLEST SERPL-MCNC: 220 MG/DL
CO2 SERPL-SCNC: 28 MMOL/L (ref 21–32)
CREAT SERPL-MCNC: 0.74 MG/DL (ref 0.55–1.02)
DIFFERENTIAL METHOD BLD: ABNORMAL
ECHO AO ROOT DIAM: 2.7 CM
ECHO AO ROOT INDEX: 1.5 CM/M2
ECHO AV AREA PEAK VELOCITY: 1.5 CM2
ECHO AV AREA/BSA PEAK VELOCITY: 0.8 CM2/M2
ECHO AV PEAK GRADIENT: 14 MMHG
ECHO AV PEAK VELOCITY: 1.8 M/S
ECHO AV VELOCITY RATIO: 0.56
ECHO EST RA PRESSURE: 3 MMHG
ECHO LA DIAMETER INDEX: 2.06 CM/M2
ECHO LA DIAMETER: 3.7 CM
ECHO LA TO AORTIC ROOT RATIO: 1.37
ECHO LA VOL 2C: 45 ML (ref 22–52)
ECHO LA VOL 4C: 48 ML (ref 22–52)
ECHO LA VOLUME AREA LENGTH: 52 ML
ECHO LA VOLUME INDEX A2C: 25 ML/M2 (ref 16–34)
ECHO LA VOLUME INDEX A4C: 27 ML/M2 (ref 16–34)
ECHO LA VOLUME INDEX AREA LENGTH: 29 ML/M2 (ref 16–34)
ECHO LV E' LATERAL VELOCITY: 12 CM/S
ECHO LV E' SEPTAL VELOCITY: 8 CM/S
ECHO LV FRACTIONAL SHORTENING: 37 % (ref 28–44)
ECHO LV INTERNAL DIMENSION DIASTOLE INDEX: 2.28 CM/M2
ECHO LV INTERNAL DIMENSION DIASTOLIC: 4.1 CM (ref 3.9–5.3)
ECHO LV INTERNAL DIMENSION SYSTOLIC INDEX: 1.44 CM/M2
ECHO LV INTERNAL DIMENSION SYSTOLIC: 2.6 CM
ECHO LV IVSD: 0.9 CM (ref 0.6–0.9)
ECHO LV MASS 2D: 123 G (ref 67–162)
ECHO LV MASS INDEX 2D: 68.3 G/M2 (ref 43–95)
ECHO LV POSTERIOR WALL DIASTOLIC: 1 CM (ref 0.6–0.9)
ECHO LV RELATIVE WALL THICKNESS RATIO: 0.49
ECHO LVOT AREA: 2.8 CM2
ECHO LVOT DIAM: 1.9 CM
ECHO LVOT PEAK GRADIENT: 4 MMHG
ECHO LVOT PEAK VELOCITY: 1 M/S
ECHO MV A VELOCITY: 0.77 M/S
ECHO MV AREA PHT: 3 CM2
ECHO MV E DECELERATION TIME (DT): 249.8 MS
ECHO MV E VELOCITY: 0.94 M/S
ECHO MV E/A RATIO: 1.22
ECHO MV E/E' LATERAL: 7.83
ECHO MV E/E' RATIO (AVERAGED): 9.79
ECHO MV E/E' SEPTAL: 11.75
ECHO MV PRESSURE HALF TIME (PHT): 72.4 MS
ECHO MV REGURGITANT PEAK GRADIENT: 74 MMHG
ECHO MV REGURGITANT PEAK VELOCITY: 4.3 M/S
ECHO PV MAX VELOCITY: 1.1 M/S
ECHO PV PEAK GRADIENT: 4 MMHG
ECHO RIGHT VENTRICULAR SYSTOLIC PRESSURE (RVSP): 28 MMHG
ECHO RV TAPSE: 2.5 CM (ref 1.7–?)
ECHO TV REGURGITANT MAX VELOCITY: 2.52 M/S
ECHO TV REGURGITANT PEAK GRADIENT: 25 MMHG
EOSINOPHIL # BLD: 0.2 K/UL (ref 0–0.4)
EOSINOPHIL NFR BLD: 2 % (ref 0–7)
ERYTHROCYTE [DISTWIDTH] IN BLOOD BY AUTOMATED COUNT: 18.1 % (ref 11.5–14.5)
EST. AVERAGE GLUCOSE BLD GHB EST-MCNC: 108 MG/DL
GLOBULIN SER CALC-MCNC: 3.5 G/DL (ref 2–4)
GLUCOSE SERPL-MCNC: 120 MG/DL (ref 65–100)
HBA1C MFR BLD: 5.4 % (ref 4–5.6)
HCT VFR BLD AUTO: 37.8 % (ref 35–47)
HDLC SERPL-MCNC: 53 MG/DL
HDLC SERPL: 4.2 (ref 0–5)
HGB BLD-MCNC: 11.4 G/DL (ref 11.5–16)
IMM GRANULOCYTES # BLD AUTO: 0 K/UL (ref 0–0.04)
IMM GRANULOCYTES NFR BLD AUTO: 0 % (ref 0–0.5)
INR PPP: 1.1 (ref 0.9–1.1)
LDLC SERPL CALC-MCNC: 138.2 MG/DL (ref 0–100)
LYMPHOCYTES # BLD: 1.6 K/UL (ref 0.8–3.5)
LYMPHOCYTES NFR BLD: 18 % (ref 12–49)
MAGNESIUM SERPL-MCNC: 2.2 MG/DL (ref 1.6–2.4)
MCH RBC QN AUTO: 18.4 PG (ref 26–34)
MCHC RBC AUTO-ENTMCNC: 30.2 G/DL (ref 30–36.5)
MCV RBC AUTO: 61.1 FL (ref 80–99)
MONOCYTES # BLD: 0.4 K/UL (ref 0–1)
MONOCYTES NFR BLD: 4 % (ref 5–13)
NEUTS SEG # BLD: 6.5 K/UL (ref 1.8–8)
NEUTS SEG NFR BLD: 75 % (ref 32–75)
NRBC # BLD: 0 K/UL (ref 0–0.01)
NRBC BLD-RTO: 0 PER 100 WBC
PLATELET # BLD AUTO: 240 K/UL (ref 150–400)
POTASSIUM SERPL-SCNC: 3.2 MMOL/L (ref 3.5–5.1)
PROT SERPL-MCNC: 7.2 G/DL (ref 6.4–8.2)
PROTHROMBIN TIME: 11.1 SEC (ref 9–11.1)
RBC # BLD AUTO: 6.19 M/UL (ref 3.8–5.2)
RBC MORPH BLD: ABNORMAL
RBC MORPH BLD: ABNORMAL
SODIUM SERPL-SCNC: 140 MMOL/L (ref 136–145)
THERAPEUTIC RANGE,PTTT: NORMAL SECS (ref 58–77)
TRIGL SERPL-MCNC: 144 MG/DL (ref ?–150)
TROPONIN I SERPL HS-MCNC: 71 NG/L (ref 0–37)
TSH SERPL DL<=0.05 MIU/L-ACNC: 1.7 UIU/ML (ref 0.36–3.74)
VLDLC SERPL CALC-MCNC: 28.8 MG/DL
WBC # BLD AUTO: 8.8 K/UL (ref 3.6–11)

## 2023-03-06 PROCEDURE — 97162 PT EVAL MOD COMPLEX 30 MIN: CPT

## 2023-03-06 PROCEDURE — 80053 COMPREHEN METABOLIC PANEL: CPT

## 2023-03-06 PROCEDURE — 70551 MRI BRAIN STEM W/O DYE: CPT

## 2023-03-06 PROCEDURE — 36415 COLL VENOUS BLD VENIPUNCTURE: CPT

## 2023-03-06 PROCEDURE — 74011000250 HC RX REV CODE- 250: Performed by: FAMILY MEDICINE

## 2023-03-06 PROCEDURE — 74011250637 HC RX REV CODE- 250/637: Performed by: NURSE PRACTITIONER

## 2023-03-06 PROCEDURE — 85025 COMPLETE CBC W/AUTO DIFF WBC: CPT

## 2023-03-06 PROCEDURE — 80061 LIPID PANEL: CPT

## 2023-03-06 PROCEDURE — 93306 TTE W/DOPPLER COMPLETE: CPT | Performed by: INTERNAL MEDICINE

## 2023-03-06 PROCEDURE — 93306 TTE W/DOPPLER COMPLETE: CPT

## 2023-03-06 PROCEDURE — 83036 HEMOGLOBIN GLYCOSYLATED A1C: CPT

## 2023-03-06 PROCEDURE — 83735 ASSAY OF MAGNESIUM: CPT

## 2023-03-06 PROCEDURE — 85730 THROMBOPLASTIN TIME PARTIAL: CPT

## 2023-03-06 PROCEDURE — 97165 OT EVAL LOW COMPLEX 30 MIN: CPT

## 2023-03-06 PROCEDURE — 97116 GAIT TRAINING THERAPY: CPT

## 2023-03-06 PROCEDURE — 84443 ASSAY THYROID STIM HORMONE: CPT

## 2023-03-06 PROCEDURE — 97112 NEUROMUSCULAR REEDUCATION: CPT

## 2023-03-06 PROCEDURE — 99222 1ST HOSP IP/OBS MODERATE 55: CPT | Performed by: INTERNAL MEDICINE

## 2023-03-06 PROCEDURE — 85610 PROTHROMBIN TIME: CPT

## 2023-03-06 PROCEDURE — 93005 ELECTROCARDIOGRAM TRACING: CPT

## 2023-03-06 PROCEDURE — 99222 1ST HOSP IP/OBS MODERATE 55: CPT | Performed by: PSYCHIATRY & NEUROLOGY

## 2023-03-06 PROCEDURE — 84484 ASSAY OF TROPONIN QUANT: CPT

## 2023-03-06 PROCEDURE — 74011250637 HC RX REV CODE- 250/637: Performed by: FAMILY MEDICINE

## 2023-03-06 RX ORDER — ATORVASTATIN CALCIUM 40 MG/1
40 TABLET, FILM COATED ORAL DAILY
Qty: 30 TABLET | Refills: 0 | Status: SHIPPED | OUTPATIENT
Start: 2023-03-06

## 2023-03-06 RX ORDER — ASPIRIN 81 MG/1
81 TABLET ORAL DAILY
Status: SHIPPED | COMMUNITY
Start: 2023-03-06

## 2023-03-06 RX ORDER — POTASSIUM CHLORIDE 750 MG/1
20 TABLET, FILM COATED, EXTENDED RELEASE ORAL ONCE
Status: COMPLETED | OUTPATIENT
Start: 2023-03-06 | End: 2023-03-06

## 2023-03-06 RX ORDER — ATORVASTATIN CALCIUM 40 MG/1
40 TABLET, FILM COATED ORAL DAILY
Status: DISCONTINUED | OUTPATIENT
Start: 2023-03-06 | End: 2023-03-06 | Stop reason: HOSPADM

## 2023-03-06 RX ORDER — GUAIFENESIN 100 MG/5ML
81 LIQUID (ML) ORAL DAILY
Status: DISCONTINUED | OUTPATIENT
Start: 2023-03-06 | End: 2023-03-06 | Stop reason: HOSPADM

## 2023-03-06 RX ADMIN — LISINOPRIL 40 MG: 20 TABLET ORAL at 08:40

## 2023-03-06 RX ADMIN — Medication 1 TABLET: at 08:40

## 2023-03-06 RX ADMIN — SODIUM CHLORIDE, PRESERVATIVE FREE 10 ML: 5 INJECTION INTRAVENOUS at 00:00

## 2023-03-06 RX ADMIN — POTASSIUM CHLORIDE 20 MEQ: 750 TABLET, FILM COATED, EXTENDED RELEASE ORAL at 13:05

## 2023-03-06 RX ADMIN — SODIUM CHLORIDE, PRESERVATIVE FREE 10 ML: 5 INJECTION INTRAVENOUS at 05:33

## 2023-03-06 RX ADMIN — ASPIRIN 81 MG CHEWABLE TABLET 81 MG: 81 TABLET CHEWABLE at 08:40

## 2023-03-06 NOTE — DISCHARGE INSTRUCTIONS
Discharge Instructions       PATIENT ID: Penny Weller  MRN: [id number]   YOB: 1950    DATE OF ADMISSION: [unfilled]    DATE OF DISCHARGE: 3/6/2023    PRIMARY CARE PROVIDER: @PCP@     ATTENDING PHYSICIAN: [unfilled]  DISCHARGING PROVIDER: Brielle Donaldson MD    To contact this individual call [phone] and ask the  to page. If unavailable ask to be transferred the Adult Hospitalist Department. DISCHARGE DIAGNOSES dizziness     CONSULTATIONS: [unfilled]    PROCEDURES/SURGERIES: * No surgery found *    PENDING TEST RESULTS:   At the time of discharge the following test results are still pending: none     FOLLOW UP APPOINTMENTS:   [unfilled]     ADDITIONAL CARE RECOMMENDATIONS:   Will start taking lipitor 40mg once a day, should have cholesterol level and liver function level followed at your PCP's office in 4-6 weeks  Monitor blood pressure   Follow up your blood sugar at your PCP's office      DIET: Cardiac Diet      ACTIVITY: Activity as tolerated          Radiology      DISCHARGE MEDICATIONS:   See Medication Reconciliation Form    It is important that you take the medication exactly as they are prescribed. Keep your medication in the bottles provided by the pharmacist and keep a list of the medication names, dosages, and times to be taken in your wallet. Do not take other medications without consulting your doctor. NOTIFY YOUR PHYSICIAN FOR ANY OF THE FOLLOWING:   Fever over 101 degrees for 24 hours. Chest pain, shortness of breath, fever, chills, nausea, vomiting, diarrhea, change in mentation, falling, weakness, bleeding. Severe pain or pain not relieved by medications. Or, any other signs or symptoms that you may have questions about.       DISPOSITION:    Home With:   OT  PT  HH  RN       SNF/Inpatient Rehab/LTAC    Independent/assisted living    Hospice   x Other:     CDMP Checked:   Yes x     PROBLEM LIST Updated:  Yes x       Signed:   Brielle Donaldson MD  3/6/2023  4:01 PM

## 2023-03-06 NOTE — H&P
History and Physical    Date of Service:  3/5/2023  Primary Care Provider: John Hemphill MD  Source of information: The patient and Chart review    Chief Complaint: Dizziness and Vomiting      History of Presenting Illness:   Cleopatra Lowry is a 67 y.o. female with past medical history of hypertension and thalassemia minor presented to as a direct admission/transfer from [facility] ([facility] to [facility] with reported dizziness, unsteady gait, nausea, and vomiting. Symptom onset reportedly began ~ 2 hours prior to going to ED. Symptoms remained constant, moderate to severe, worsening when sitting up, and decreased at rest with associated nausea and vomiting. She initially went to [facility]. Work-up included labs which were abnormal for hemoglobin 10.7, MCV 61.4, BUN 23, high-sensitivity troponin 112. CT head without IV contrast showed no acute process. CTA head and neck with IV contrast showed no acute process. ED consulted  tele-neurologist.  12 lead EKG shows sinus bradycardia, RBBB, nonspecific ST-T wave changes at 59 bpm.  ED ordered aspirin 325 mg p.o. x1 dose. There was concern that patient had prolonged QTc on EKG and there is other concerns for possible symptomatic bradycardia according to the ER note. Patient was then transferred to [facility].  At current, patient notes dizziness has resolved. She does not report any slurred speech, facial droop, numbness, paresthesias, focal weakness, visual disturbance. She does not recall having similar symptoms in the past.       REVIEW OF SYSTEMS:  A comprehensive review of systems was negative except for that written in the History of Present Illness. Past Medical History:   Diagnosis Date    Hypertension     Thalassemia         MEDICAL HISTORY:  Prior to Admission medications    Medication Sig Start Date End Date Taking?  Authorizing Provider   calcium-cholecalciferol, d3, (CALCIUM 600 + D) 600-125 mg-unit tab Take  by mouth.   Yes Other, MD Keshav   clobetasoL (TEMOVATE) 0.05 % topical cream Apply  to affected area two (2) times a day. Yes Other, MD Keshav   augmented betamethasone dipropionate (DIPROLENE-AF) 0.05 % topical cream  7/21/22   Other, MD Keshav   chlorthalidone (HYGROTON) 25 mg tablet 12.5 mg. 2/1/22   Other, MD Keshav   ramipriL (ALTACE) 10 mg capsule  7/13/22   Other, MD Keshav     Allergies   Allergen Reactions    Sporanox [Itraconazole] Unknown (comments)      FAMILY HISTORY:  No reported h/o CAD/ stroke    SOCIAL HISTORY:  SMOKING: DENIES  ALCOHOL:  DENIES  ILLICIT DRUGS: DENIES    Social Determinants of Health     Tobacco Use: Low Risk     Smoking Tobacco Use: Never    Smokeless Tobacco Use: Never    Passive Exposure: Not on file   Alcohol Use: Not on file   Financial Resource Strain: Not on file   Food Insecurity: Not on file   Transportation Needs: Not on file   Physical Activity: Not on file   Stress: Not on file   Social Connections: Not on file   Intimate Partner Violence: Not on file   Depression: Not on file   Housing Stability: Not on file        Medications were reconciled to the best of my ability given all available resources at the time of admission. Route is PO if not otherwise noted. Family and social history were personally reviewed, all pertinent and relevant details are outlined as above. Objective:   Visit Vitals  BP (!) 168/92   Pulse 79   Temp 97.8 °F (36.6 °C)   Resp 22   Wt 78.8 kg (173 lb 11.6 oz)   SpO2 97%   BMI 31.77 kg/m²      O2 Device: None (Room air)    PHYSICAL EXAM:   General:  Patient is in no acute respiratory distress. Head:  Normocephalic, without obvious abnormality, atraumatic   Eyes:  Conjunctivae/corneas clear. PERRL, EOMs intact   E/N/M/T: Nares normal. Septum midline.  No nasal drainage or sinus tenderness  Tongue midline/ non-edematous  Clear oropharynx   Neck: Normal appearance and movements, symmetrical, trachea midline  No palpable adenopathy  No thyroid enlargement, tenderness or nodules  No carotid bruit   No JVD  Trachea midline   Lungs:   Symmetrical chest expansion and respiratory effort  Clear to auscultation bilaterally   Chest wall:  No tenderness or deformity   Heart:  Regular rate and rhythm   Normal S1 and S2; no murmur, click, rub or gallop   Abdomen:   Soft, no tenderness  No rebound, guarding, or rigidity  Non-distended   Bowel sounds normal  No masses or hepatosplenomegaly  No hernias present   Back: No costovertebral angle tenderness  No step-off deformity   Extremities: Extremities normal, atraumatic  No cyanosis or edema     Vascular/  Pulses: 2+ radial/ 1+ DP bilateral pulses   Integument/  Skin: No rashes or ulcers  Warm and dry   Musculo-      skeletal: Gait not tested  No calf tenderness   Neuro: GCS 15. Moves all extremities x 4. No slurred speech. No facial droop. Sensation grossly intact. No pronator drift. Psych: Alert, oriented x 3              Data Review:   I have independently reviewed and interpreted patient's lab and all other diagnostic data    Abnormal Labs Reviewed   CBC WITH AUTOMATED DIFF - Abnormal; Notable for the following components:       Result Value    RBC 5.75 (*)     HGB 10.7 (*)     MCV 61.4 (*)     MCH 18.6 (*)     RDW 17.9 (*)     IMMATURE GRANULOCYTES 1 (*)     ABS. IMM. GRANS. 0.1 (*)     All other components within normal limits   METABOLIC PANEL, BASIC - Abnormal; Notable for the following components:    Glucose 128 (*)     BUN 23 (*)     BUN/Creatinine ratio 32 (*)     All other components within normal limits   TROPONIN-HIGH SENSITIVITY - Abnormal; Notable for the following components:    Troponin-High Sensitivity 112 (*)     All other components within normal limits       All Micro Results       None            IMAGING:   CT HEAD WO CONT   Final Result      1. No acute process. No large vessel occlusion, arterial dissection, or   flow-limiting stenosis. 2. CT perfusion not performed.   If high clinical concern for acute process,   consider MRI (unless contraindicated). CTA HEAD NECK W CONT   Final Result      1. No acute process. No large vessel occlusion, arterial dissection, or   flow-limiting stenosis. 2. CT perfusion not performed. If high clinical concern for acute process,   consider MRI (unless contraindicated). ECG/ECHO:    Results for orders placed or performed during the hospital encounter of 03/05/23   EKG, 12 LEAD, INITIAL   Result Value Ref Range    Ventricular Rate 59 BPM    Atrial Rate 59 BPM    P-R Interval 146 ms    QRS Duration 134 ms    Q-T Interval 552 ms    QTC Calculation (Bezet) 546 ms    Calculated P Axis 72 degrees    Calculated R Axis -5 degrees    Calculated T Axis -22 degrees    Diagnosis       Sinus bradycardia  Right bundle branch block  Nonspecific ST abnormality  No previous ECGs available  Confirmed by Daisy Matias ([id number]) on 3/5/2023 2:53:54 PM            Notes reviewed from all clinical/nonclinical/nursing services involved in patient's clinical care. Care coordination discussions were held with appropriate clinical/nonclinical/ nursing providers based on care coordination needs. Assessment:   Given the patient's current clinical presentation, there is a high level of concern for decompensation if discharged from the emergency department. Complex decision making was performed, which includes reviewing the patient's available past medical records, laboratory results, and imaging studies. Active Problems:    1. Dizziness        Unsteady gait  -admitted to neuro/ stroke floor  -place on fall precautions and neuro checks  -CTA head / neck with IV contrast  -Order MRI of the brain without IV contrast.  Rule out VBI  -Consider neurology consultation  -Possibly secondary to symptomatic bradycardia according to reports    2.   Elevated troponin  -Continue telemetry monitoring  -Repeat/trend high sensitive troponin levels  -Order 2D echocardiogram  -Consult cardiologist  -Continue aspirin therapy    3. Abnormal EKG  -Prolonged QTc  -RBBB  -Continue with telemetry monitoring    4. Bradycardia  -Reported per ED  -Currently resolved    5. Essential hypertension  -Monitor blood pressure closely  -Resume lisinopril home dose    6. Microcytic anemia  -Hemoglobin 10.7, MCV 61.4  -Repeat H&H    7. Nausea and vomiting  -currently resolved      DIET: ADULT DIET Regular; Low Fat/Low Chol/High Fiber/2 gm Na   ISOLATION PRECAUTIONS: There are currently no Active Isolations  CODE STATUS: Full Code   DVT PROPHYLAXIS: SCDs  FUNCTIONAL STATUS PRIOR TO HOSPITALIZATION: Ambulatory and capable of self-care but relies on assistive devices (rolling walker/cane). Ambulatory status/function: Ambulates with assistance:  Cane   EARLY MOBILITY ASSESSMENT: Recommend an assessment from physical therapy and/or occupational therapy  ANTICIPATED DISCHARGE: 24-48 hours. ANTICIPATED DISPOSITION: Home with Home Healthcare  EMERGENCY CONTACT/SURROGATE DECISION MAKER:  Stephen Stapleton,  [phone]. CRITICAL CARE WAS PERFORMED FOR THIS ENCOUNTER: NO.    ADVANCED DIRECTIVE/ CODE STATUS: FULL CODE as per discussion with patient. Signed By: Eduardo Oliveira MD     March 5, 2023         Please note that this dictation may have been completed with Dragon, the [employer] Interactive voice recognition software. Quite often unanticipated grammatical, syntax, homophones, and other interpretive errors are inadvertently transcribed by the computer software. Please disregard these errors. Please excuse any errors that have escaped final proofreading.

## 2023-03-06 NOTE — PROGRESS NOTES
PHYSICAL THERAPY EVALUATION/DISCHARGE  Patient: Jose Luis Pemberton (4 y.o. female)  Date: 3/6/2023  Primary Diagnosis: Dizziness [R42]       Precautions:          ASSESSMENT  Based on the objective data described below, the patient presents with no functional deficits at this time that will benefit from continued physical therapy services. Pt scored a 55/56 on BBS denoting a low risk for falls. Pt ambulated 300ft without AD and supervision along with 10 steps with single railing and supervision. Pt demos no LOB or difficulty with ambulation and general mobility. Pt demos interest in return to gym for exercise program, education provided on monitoring exertion and safety with exercise. Pt verbalizes good understanding of all education provided. Pt reports no symptoms of dizziness throughout treatment, vitals were normal and stable. Functional Outcome Measure: The patient scored 55/56 on the Gotti Balance Scale outcome measure which is indicative of Low risk for falls. Other factors to consider for discharge: Good home support via spouse     Further skilled acute physical therapy is not indicated at this time. PLAN :  Recommendation for discharge: (in order for the patient to meet his/her long term goals)  No skilled physical therapy/ follow up rehabilitation needs identified at this time. This discharge recommendation:  Has been made in collaboration with the attending provider and/or case management    IF patient discharges home will need the following DME: none       SUBJECTIVE:   Patient stated I have been up and down all night.     OBJECTIVE DATA SUMMARY:   HISTORY:    Past Medical History:   Diagnosis Date    Hypertension     Thalassemia    History reviewed. No pertinent surgical history.     Prior level of function: Independent in the community  Personal factors and/or comorbidities impacting plan of care:     Home Situation  Home Environment: Private residence  # Steps to Enter: 3  Rails to Enter: Yes  Hand Rails : Left  # of Interior Steps: 13  Interior Rails: Right  Living Alone: No  Support Systems: Spouse/Significant Other  Patient Expects to be Discharged to[de-identified] Home  Current DME Used/Available at Home: Ashley Rivera, lynsey, Cane, straight, Shower chair  Tub or Shower Type: Shower    EXAMINATION/PRESENTATION/DECISION MAKING:   Critical Behavior:  Neurologic State: Alert  Orientation Level: Oriented X4  Cognition: Appropriate decision making, Follows commands     Hearing: Auditory  Auditory Impairment: None    Range Of Motion:  AROM: Within functional limits           PROM: Within functional limits           Strength:    Strength: Within functional limits                    Tone & Sensation:   Tone: Normal              Sensation: Intact               Coordination:  Coordination: Within functional limits  Vision:      Functional Mobility:  Bed Mobility:     Supine to Sit: Independent  Sit to Supine: Independent     Transfers:  Sit to Stand: Independent  Stand to Sit: Independent                       Balance:   Sitting: Intact  Standing: Intact  Standing - Static: Good; Unsupported  Standing - Dynamic : Good; Unsupported  Ambulation/Gait Training:  Distance (ft): 300 Feet (ft)  Assistive Device: Gait belt  Ambulation - Level of Assistance: Supervision                                   Stairs:  Number of Stairs Trained: 10  Stairs - Level of Assistance: Supervision   Rail Use: Right       Functional Measure:  Gotti Balance Test:    Sitting to Standin  Standing Unsupported: 4  Sitting with Back Unsupported: 4  Standing to Sittin  Transfers: 4  Standing Unsupported with Eyes Closed: 4  Standing Unsupported with Feet Together: 4  Reach Forward with Outstretched Arm: 4   Object: 4  Turn to Look Over Shoulders: 4  Turn 360 Degrees: 4  Alternate Foot on Step/Stool: 4  Standing Unsupported One Foot in Front: 4  Stand on One Leg: 3  Total: 55/56         56=Maximum possible score;   0-20=High fall risk  21-40=Moderate fall risk   41-56=Low fall risk               Physical Therapy Evaluation Charge Determination   History Examination Presentation Decision-Making   MEDIUM  Complexity : 1-2 comorbidities / personal factors will impact the outcome/ POC  MEDIUM Complexity : 3 Standardized tests and measures addressing body structure, function, activity limitation and / or participation in recreation  LOW Complexity : Stable, uncomplicated  MEDIUM Complexity : FOTO score of 26-74      Based on the above components, the patient evaluation is determined to be of the following complexity level: MEDIUM    Pain Rating:  Pt reported no symptoms of pain    Activity Tolerance:   Good and tolerates ADLs without rest breaks      After treatment patient left in no apparent distress:   Supine in bed, Call bell within reach, and Caregiver / family present    COMMUNICATION/EDUCATION:   The patients plan of care was discussed with: Registered nurse. Fall prevention education was provided and the patient/caregiver indicated understanding. and Patient/family have participated as able in goal setting and plan of care.     Thank you for this referral.  Vega Dumont, PT   Time Calculation: 25 mins

## 2023-03-06 NOTE — PROGRESS NOTES
OCCUPATIONAL THERAPY EVALUATION/DISCHARGE  Patient: Gia Martinez (52 y.o. female)  Date: 3/6/2023  Primary Diagnosis: Dizziness [R42]       Precautions:        ASSESSMENT  Based on the objective data described below, the patient presents with baseline functional performance s/p admission for dizziness. Patient is received resting in bed, supportive family present. Patient reports symptoms have resolved, demonstrating functional mobility and dynamic ADL performance with up to supervision. Patient is receptive to education regarding compensatory ADL strategies and modifications to minimize risk of provoking dizziness symptoms and overall fall risk upon return home. This date, with item retrieval from floor level, patient with no complaints or LOB. At this time, patient with no further acute OT needs - clear for discharge home with family support from OT perspective. Current Level of Function (ADLs/self-care): mod I    Functional Outcome Measure: The patient scored Total A-D  Total A-D (Motor Function): 66/66 on the Fugl-Galeana Assessment which is indicative of no impairment in upper extremity functional status. Other factors to consider for discharge:      PLAN :  Recommendation for discharge: (in order for the patient to meet his/her long term goals)  No skilled occupational therapy/ follow up rehabilitation needs identified at this time. This discharge recommendation:  Has been made in collaboration with the attending provider and/or case management    IF patient discharges home will need the following DME: none       SUBJECTIVE:   Patient stated I feel good.     OBJECTIVE DATA SUMMARY:   HISTORY:   Past Medical History:   Diagnosis Date    Hypertension     Thalassemia    History reviewed. No pertinent surgical history.     Prior Level of Function/Environment/Context: independent and active; driving (wears distance glasses when driving at night, however tries to avoid nighttime driving)    Expanded or extensive additional review of patient history:     Home Situation  Home Environment: Private residence  # Steps to Enter: 3  Rails to Enter: Yes  Hand Rails : Left  # of Interior Steps: 13  Interior Rails: Right  Living Alone: No  Support Systems: Spouse/Significant Other  Patient Expects to be Discharged to[de-identified] Home  Current DME Used/Available at Home: Magui Strafford, rolling, Cane, straight, Shower chair  Tub or Shower Type: Shower    Hand dominance: Right    EXAMINATION OF PERFORMANCE DEFICITS:  Cognitive/Behavioral Status:  Neurologic State: Alert; Appropriate for age  Orientation Level: Oriented X4  Cognition: Appropriate decision making; Follows commands  Perception: Appears intact  Perseveration: No perseveration noted  Safety/Judgement: Awareness of environment; Fall prevention;Home safety; Insight into deficits    Skin:     Edema:     Hearing: Auditory  Auditory Impairment: None    Vision/Perceptual:    Tracking: Able to track stimulus in all quadrants w/o difficulty    Saccades: Within Defined Limits    Visual Fields:  (WFL)  Diplopia: No    Acuity: Within Defined Limits;Able to read clock/calendar on wall without difficulty    Corrective Lenses: Reading glasses (also glasses for distance (wearing for night driving, but avoids as able))    Range of Motion:  AROM: Within functional limits  PROM: Within functional limits    Strength:  Strength: Within functional limits    Coordination:  Coordination: Within functional limits  Fine Motor Skills-Upper: Left Intact; Right Intact    Gross Motor Skills-Upper: Left Intact; Right Intact    Tone & Sensation:  Tone: Normal  Sensation: Intact    Balance:  Sitting: Intact  Standing: Intact  Standing - Static: Good; Unsupported  Standing - Dynamic : Good; Unsupported    Functional Mobility and Transfers for ADLs:  Bed Mobility:  Supine to Sit: Independent  Sit to Supine: Independent    Transfers:  Sit to Stand: Independent  Stand to Sit: Independent  Bathroom Mobility: Independent  Toilet Transfer : Independent    ADL Assessment:  Feeding: Independent    Oral Facial Hygiene/Grooming: Independent    Bathing: Modified independent    Type of Bath: Patient refused    Upper Body Dressing: Independent    Lower Body Dressing: Modified independent    Toileting: Modified independent    ADL Intervention and task modifications:  Lower Body Dressing Assistance  Dressing Assistance: Modified independent  Socks: Modified independent    Cognitive Retraining  Safety/Judgement: Awareness of environment; Fall prevention;Home safety; Insight into deficits    Educated patient on fall prevention and energy conservation strategies to facilitate greater independence in valued roles/routines and maximize patient safety. Patient verbalized/demonstrated good understanding of topics discussed. Pacing activities and taking frequent rest breaks   4. Prioritizing daily tasks, spreading needed tasks throughout day/week as able  5. Maintaining eyes open and back to showerhead when washing hair  6.    Utilizing tailor sitting method for lower body tasks to minimize deep bending    Functional Measure:  Fugl-Galeana Assessment of Motor Recovery after Stroke:   Upper Extremity Assessment: Yes    Reflex Activity  Flexors/Biceps/Fingers: Can be elicited  Extensors/Triceps: Can be elicited  Reflex Subtotal: 4    Volitional Movement Within Synergies  Shoulder Retraction: Full  Shoulder Elevation: Full  Shoulder Abduction (90 degrees): Full  Shoulder External Rotation: Full  Elbow Flexion: Full  Forearm Supination: Full  Shoulder Adduction/Internal Rotation: Full  Elbow Extension: Full  Forearm Pronation: Full  Subtotal: 18    Volitional Movement Mixing Synergies  Hand to Lumbar Spine: Full  Shoulder Flexion (0-90 degrees): Full  Pronation-Supination: Full  Subtotal: 6    Volitional Movement With Little or No Synergy  Shoulder Abduction (0-90 degrees): Full  Shoulder Flexion ( degrees): Full  Pronation/Supination: Full  Subtotal : 6    Normal Reflex Activity  Biceps, Triceps, Finger Flexors: Full  Subtotal : 2    Upper Extremity Total   Upper Extremity Total: 36    Wrist  Stability at 15 Degree Dorsiflexion: Full  Repeated Dorsiflexion/ Volar Flexion: Full  Stability at 15 Degree Dorsiflexion: Full  Repeated Dorsiflexion/ Volar Flexion: Full  Circumduction: Full  Wrist Total: 10    Hand  Mass Flexion: Full  Mass Extension: Full  Grasp A: Full  Grasp B: Full  Grasp C: Full  Grasp D: Full  Grasp E: Full  Hand Total: 14    Coordination/Speed  Tremor: None  Dysmetria: None  Time: <1s (5s)  Coordination/Speed Total : 6    Total A-D  Total A-D (Motor Function): 66/66     This is a reliable/valid measure of arm function after a neurological event. It has established value to characterize functional status and for measuring spontaneous and therapy-induced recovery; tests proximal and distal motor functions. Fugl-Galeana Assessment - UE scores recorded between five and 30 days post neurologic event can be used to predict UE recovery at six months post neurologic event. Severe = 0-21 points   Moderately Severe = 22-33 points   Moderate = 34-47 points   Mild = 48-66 points  ZACHARY Sy, MELISSA Serrano, & JOHNNY Sahu (1992). Measurement of motor recovery after stroke: Outcome assessment and sample size requirements.  Stroke, 23, pp. 9361-6229.   ------------------------------------------------------------------------------------------------------------------------------------------------------------------  MCID:  Stroke:   Gabbie Contreras, 2001; n = 171; mean age 79 (5) years; assessed within 16 (12) days of stroke, Acute Stroke)  FMA Motor Scores from Admission to Discharge   10 point increase in FMA Upper Extremity = 1.5 change in discharge FIM   10 point increase in FMA Lower Extremity = 1.9 change in discharge FIM  MDC:   Stroke:   Cristal Galan et al, 2008, n = 14, mean age = 59.9 (14.6) years, assessed on average 14 (6.5) months post stroke, Chronic Stroke)   FMA = 5.2 points for the Upper Extremity portion of the assessment     Occupational Therapy Evaluation Charge Determination   History Examination Decision-Making   LOW Complexity : Brief history review  LOW Complexity : 1-3 performance deficits relating to physical, cognitive , or psychosocial skils that result in activity limitations and / or participation restrictions  LOW Complexity : No comorbidities that affect functional and no verbal or physical assistance needed to complete eval tasks       Based on the above components, the patient evaluation is determined to be of the following complexity level: LOW   Pain Rating:  No reports. Activity Tolerance:   Good    After treatment patient left in no apparent distress:    Supine in bed, Call bell within reach, and Caregiver / family present    COMMUNICATION/EDUCATION:   The patients plan of care was discussed with: Physical therapist and Registered nurse. Patient and/or family was verbally educated on the BE FAST acronym for signs/symptoms of CVA and TIA. All questions answered with patient indicating good understanding.      Thank you for this referral.  Harmeet Bojorquez OT  Time Calculation: 15 mins

## 2023-03-06 NOTE — CONSULTS
Neurology Consult Note     NAME: Yessy Farah   :  1950   MRN:  [id number]   DATE:  3/6/2023       HPI:  Pt is a 65yo RH female admitted 3/5/23 with c/o spinning dizziness upon awakening 2 hours earlier. Pt reports onset of spinning dizziness wakening at 6 AM, she got back in bed and it stopped when she got back up it was gone. Denies any positional component. She sat in a chair and drink some coffee and then got up to go downstairs and had sudden nausea and vomiting x1. She did not have any recurrent dizziness, she did not have any trouble walking was not ataxic, no other associated symptoms. She denies prior history of vertigo, may get occasional lightheadedness that she attributes to her blood pressure medications. Admitted from the [address] [place] [address] for symptomatic bradycardia with EKG with RBBB and prolonged QT. CTH neg. CTA H/N no LVO, no significant stenosis, CTP not done. MRI brain with CIWM changes. TTE with EF 60-65%, no PFO. Lipid profile, HgbA1C are not completed yet. She just had labs at her PCP's office, LDL is 134. She is not on APT or a statin at home. +HTN, no DM, no Smoking, no PHILL, +family h/o CVA in father. PMH:  HTN  Thalassemia minor      ROS:  Per HPI o/w neg      MEDS:  Home:  Prior to Admission Medications   Prescriptions Last Dose Informant Patient Reported? Taking? augmented betamethasone dipropionate (DIPROLENE-AF) 0.05 % topical cream   Yes No   calcium-cholecalciferol, d3, (CALCIUM 600 + D) 600-125 mg-unit tab   Yes Yes   Sig: Take  by mouth. chlorthalidone (HYGROTON) 25 mg tablet   Yes No   Si.5 mg.   clobetasoL (TEMOVATE) 0.05 % topical cream   Yes Yes   Sig: Apply  to affected area two (2) times a day.    ramipriL (ALTACE) 10 mg capsule   Yes No      Facility-Administered Medications: None     Current Facility-Administered Medications:     aspirin chewable tablet 81 mg, 81 mg, Oral, DAILY, Brian Remy MD, 81 mg at 23 0840    potassium chloride SR (KLOR-CON 10) tablet 20 mEq, 20 mEq, Oral, ONCE, Jory Brown Level., NP    lisinopriL (PRINIVIL, ZESTRIL) tablet 40 mg, 40 mg, Oral, DAILY, Brian Remy MD, 40 mg at 23 0840    calcium-vitamin D (OS-NICOLE +D3) 500 mg-200 unit per tablet 1 Tablet, 1 Tablet, Oral, DAILY, Brian Remy MD, 1 Tablet at 23 0840    sodium chloride (NS) flush 5-40 mL, 5-40 mL, IntraVENous, Q8H, eLe Remy MD, 10 mL at 23 0533    sodium chloride (NS) flush 5-40 mL, 5-40 mL, IntraVENous, PRN, Brian Reym MD    acetaminophen (TYLENOL) tablet 650 mg, 650 mg, Oral, Q6H PRN **OR** acetaminophen (TYLENOL) suppository 650 mg, 650 mg, Rectal, Q6H PRN, Brian Remy MD    polyethylene glycol (MIRALAX) packet 17 g, 17 g, Oral, DAILY PRN, Lee Remy MD    ondansetron (ZOFRAN ODT) tablet 4 mg, 4 mg, Oral, Q8H PRN **OR** ondansetron (ZOFRAN) injection 4 mg, 4 mg, IntraVENous, Q6H PRN, Brian Remy MD      Allergies   Allergen Reactions    Sporanox [Itraconazole] Unknown (comments)         SH:   No T/E/D    FH:  F- ESRD with PKD, CVA      PHYSICAL EXAM:    Visit Vitals  /77 (BP 1 Location: Left upper arm, BP Patient Position: Sitting)   Pulse 64   Temp 97.5 °F (36.4 °C)   Resp 14   Ht 5' 2\" (1.575 m)   Wt 173 lb 11.6 oz (78.8 kg)   SpO2 97%   BMI 31.77 kg/m²     Temp (24hrs), Av.6 °F (36.4 °C), Min:97.5 °F (36.4 °C), Max:97.8 °F (36.6 °C)    General: Well developed well nourished patient in no apparent distress. Cardiac: Regular rate and rhythm with no murmurs. Carotids: 2+ symmetric, no bruits  Extremities: 2+ Radial pulses, no cyanosis or edema    Neurological Exam:  Mental Status: Oriented to time, place and person. Speech and language intact. Attention and fund of knowledge appropriate. Normal recent and remote memory.    Cranial Nerves: VFF, PERRL, EOMI, no nystagmus, no diplopia, no ptosis. Facial sensation is normal. Facial movement is symmetric. Palate is midline. Tongue is midline. Hearing is intact   Motor:  5/5 strength in upper and lower proximal and distal muscles. Normal bulk and tone. No PD. No tremors   Reflexes:   Deep tendon reflexes 2+ and symmetric. Toes downgoing.    Sensory:   Intact to LT and PP   Gait:     Cerebellar:  Intact FTN, HTS, DANIELLE          STUDIES AND REPORTS:  Recent Results (from the past 24 hour(s))   ECHO ADULT COMPLETE    Collection Time: 03/06/23  9:01 AM   Result Value Ref Range    IVSd 0.9 0.6 - 0.9 cm    LVIDd 4.1 3.9 - 5.3 cm    LVIDs 2.6 cm    LVOT Diameter 1.9 cm    LVPWd 1.0 (A) 0.6 - 0.9 cm    LVOT Peak Gradient 4 mmHg    LVOT Peak Velocity 1.0 m/s    RVSP 28 mmHg    LA Diameter 3.7 cm    LA Volume A/L 52 mL    LA Volume 2C 45 22 - 52 mL    LA Volume 4C 48 22 - 52 mL    Est. RA Pressure 3 mmHg    AV Area by Peak Velocity 1.5 cm2    AV Peak Gradient 14 mmHg    AV Peak Velocity 1.8 m/s    MV A Velocity 0.77 m/s    MV E Wave Deceleration Time 249.8 ms    MV E Velocity 0.94 m/s    LV E' Lateral Velocity 12 cm/s    LV E' Septal Velocity 8 cm/s    MV PHT 72.4 ms    MV Area by PHT 3.0 cm2    MR Peak Gradient 74 mmHg    MR Peak Velocity 4.3 m/s    PV Peak Gradient 4 mmHg    PV Max Velocity 1.1 m/s    TAPSE 2.5 1.7 cm    TR Peak Gradient 25 mmHg    TR Max Velocity 2.52 m/s    Aortic Root 2.7 cm    Fractional Shortening 2D 37 28 - 44 %    LVIDd Index 2.28 cm/m2    LVIDs Index 1.44 cm/m2    LV RWT Ratio 0.49     LV Mass 2D 123.0 67 - 162 g    LV Mass 2D Index 68.3 43 - 95 g/m2    MV E/A 1.22     E/E' Ratio (Averaged) 9.79     E/E' Lateral 7.83     E/E' Septal 11.75     LA Volume Index A/L 29 16 - 34 mL/m2    LVOT Area 2.8 cm2    LA Volume Index 2C 25 16 - 34 mL/m2    LA Volume Index 4C 27 16 - 34 mL/m2    LA Size Index 2.06 cm/m2    LA/AO Root Ratio 1.37     Ao Root Index 1.50 cm/m2    AV Velocity Ratio 0.56 KASSIDY/BSA Peak Velocity 0.8 BU6/I8   METABOLIC PANEL, COMPREHENSIVE    Collection Time: 03/06/23  9:50 AM   Result Value Ref Range    Sodium 140 136 - 145 mmol/L    Potassium 3.2 (L) 3.5 - 5.1 mmol/L    Chloride 106 97 - 108 mmol/L    CO2 28 21 - 32 mmol/L    Anion gap 6 5 - 15 mmol/L    Glucose 120 (H) 65 - 100 mg/dL    BUN 13 6 - 20 MG/DL    Creatinine 0.74 0.55 - 1.02 MG/DL    BUN/Creatinine ratio 18 12 - 20      eGFR >60 >60 ml/min/1.73m2    Calcium 9.5 8.5 - 10.1 MG/DL    Bilirubin, total 0.7 0.2 - 1.0 MG/DL    ALT (SGPT) 26 12 - 78 U/L    AST (SGOT) 16 15 - 37 U/L    Alk. phosphatase 64 45 - 117 U/L    Protein, total 7.2 6.4 - 8.2 g/dL    Albumin 3.7 3.5 - 5.0 g/dL    Globulin 3.5 2.0 - 4.0 g/dL    A-G Ratio 1.1 1.1 - 2.2     MAGNESIUM    Collection Time: 03/06/23  9:50 AM   Result Value Ref Range    Magnesium 2.2 1.6 - 2.4 mg/dL   CBC WITH AUTOMATED DIFF    Collection Time: 03/06/23  9:50 AM   Result Value Ref Range    WBC 8.8 3.6 - 11.0 K/uL    RBC 6.19 (H) 3.80 - 5.20 M/uL    HGB 11.4 (L) 11.5 - 16.0 g/dL    HCT 37.8 35.0 - 47.0 %    MCV 61.1 (L) 80.0 - 99.0 FL    MCH 18.4 (L) 26.0 - 34.0 PG    MCHC 30.2 30.0 - 36.5 g/dL    RDW 18.1 (H) 11.5 - 14.5 %    PLATELET 721 740 - 987 K/uL    NRBC 0.0 0  WBC    ABSOLUTE NRBC 0.00 0.00 - 0.01 K/uL    NEUTROPHILS 75 32 - 75 %    LYMPHOCYTES 18 12 - 49 %    MONOCYTES 4 (L) 5 - 13 %    EOSINOPHILS 2 0 - 7 %    BASOPHILS 1 0 - 1 %    IMMATURE GRANULOCYTES 0 0.0 - 0.5 %    ABS. NEUTROPHILS 6.5 1.8 - 8.0 K/UL    ABS. LYMPHOCYTES 1.6 0.8 - 3.5 K/UL    ABS. MONOCYTES 0.4 0.0 - 1.0 K/UL    ABS. EOSINOPHILS 0.2 0.0 - 0.4 K/UL    ABS. BASOPHILS 0.1 0.0 - 0.1 K/UL    ABS. IMM.  GRANS. 0.0 0.00 - 0.04 K/UL    DF SMEAR SCANNED      RBC COMMENTS ANISOCYTOSIS  1+        RBC COMMENTS HYPOCHROMIA  2+       PROTHROMBIN TIME + INR    Collection Time: 03/06/23  9:50 AM   Result Value Ref Range    INR 1.1 0.9 - 1.1      Prothrombin time 11.1 9.0 - 11.1 sec   PTT    Collection Time: 03/06/23  9:50 AM   Result Value Ref Range    aPTT 27.9 22.1 - 31.0 sec    aPTT, therapeutic range     58.0 - 77.0 SECS   TROPONIN-HIGH SENSITIVITY    Collection Time: 03/06/23  9:50 AM   Result Value Ref Range    Troponin-High Sensitivity 71 (H) 0 - 37 ng/L   TSH 3RD GENERATION    Collection Time: 03/06/23  9:50 AM   Result Value Ref Range    TSH 1.70 0.36 - 3.74 uIU/mL     MRI Results (most recent):  Results from [place] encounter on 03/05/23    MRI BRAIN WO CONT    Narrative  *PRELIMINARY REPORT*    No evidence of acute infarct hemorrhage, or intracranial mass. Preliminary report was provided by Dr. Kali Mancilla, the on-call radiologist, on  3/6/2023 and 0245 hours. Final report to follow. *END PRELIMINARY REPORT*    FINAL REPORT:    INDICATION:   DIZZINESS    EXAMINATION:  MRI BRAIN WO CONTRAST    COMPARISON:  3/5/2023    TECHNIQUE:  Multiplanar multisequence acquisition without contrast of the brain. FINDINGS:  Ventricles:  Midline, no hydrocephalus. Brain Parenchyma/Brainstem:  Mild to moderate patchy T2 hyperintensities in the  white matter. No acute infarction. Intracranial Hemorrhage:  None. Basal Cisterns:  Normal.  Flow Voids:  Normal.  Additional Comments:  N/A. Impression  Chronic white matter disease with no acute infarction. CT Results (most recent):  Results from Hospital Encounter encounter on 03/05/23    CTA HEAD NECK W CONT    Narrative  INDICATION: room spinning dizziness/vertigo; eval for critical stenosis,  dissection, aneurysm    EXAMINATION:  CT HEAD W/O CONTRAST, CT ANGIOGRAPHY HEAD AND NECK    COMPARISON: None    TECHNIQUE: Noncontrast axial head CT was performed. Following the uneventful  administration of iodinated contrast material, axial CT angiography of the head  and neck was performed. Delayed axial images through the head were also  obtained. Coronal and sagittal reconstructions were obtained. Manual  postprocessing of images was performed.  3-D  Sagittal maximal intensity  projection images were obtained. 3-D Coronal maximal intensity projections were  obtained. CT dose reduction was achieved through use of a standardized protocol  tailored for this examination and automatic exposure control for dose  modulation. FINDINGS:    CT HEAD:    Ventricles are midline without hydrocephalus. No acute hemorrhage or infarction. Basal cisterns are patent. Paranasal sinuses and mastoid air cells are clear. CTA NECK:    Aortic Arch: No significant abnormality. Right Common Carotid Artery: No significant abnormality. Right Internal Carotid Artery: Mild calcific atherosclerosis at the origin  without significant stenosis. NASCET Right: 0-25%. Left Common Carotid Artery: Mild calcific plaque distal common carotid artery  without stenosis. Left Internal Carotid Artery: Mild to moderate calcific plaque at the origin  without significant stenosis. NASCET Left: 0-25%. Carotid stenosis determined using NASCET criteria. Right Vertebral Artery: No significant abnormality. Left Vertebral Artery: No significant abnormality. Cervical Soft Tissues: No significant abnormality. Lung Apices: No significant abnormality. Bones: No destructive bone lesion. Additional Comments: N/A.    CTA HEAD:    Posterior Circulation: No flow limiting stenosis or occlusion. Anterior Circulation: No flow limiting stenosis or occlusion. Venous Sinuses:  Patent. Additional Comments: No evidence of aneurysm or vascular malformation. Note: Cerebral perfusion not performed. Impression  1. No acute process. No large vessel occlusion, arterial dissection, or  flow-limiting stenosis. 2. CT perfusion not performed. If high clinical concern for acute process,  consider MRI (unless contraindicated).       Assessment and Plan:   Pt is a 67yo RH female with HTN, HLD, not on statin or APT at home, presenting 3/5/23 with c/o spinning dizziness upon awakening that was brief and not positional followed by one episode of N/V, no ataxia, no other associated neurological deficits. EKG with RBBB and prolonged QT. CTH neg. CTA H/N no LVO, no significant stenosis, CTP not done. MRI brain with moderate CIWM changes. TTE with EF 60-65%, no PFO. Exam is non-focal and unremarkable. Possible TIA, cannot fully exclude peripheral etiology, but atypical.  Given HTN, HLD, and CIWM changes on MRI, recommend ASA 81mg daily. Lipid profile, HgbA1C are not completed yet. Based on PCP , recommend Lipitor 40mg daily, goal LDL<70. Stable for d/c. Signed: Aron Salinas MD

## 2023-03-06 NOTE — PROGRESS NOTES
Speech pathology  Orders received, chart reviewed. Patietn in with dizziness, unsteady gait, nausea, and vomiting. Patient denied any changes with speech. She is on a regular diet/ thin liquids. MRI completed and negative for acute infarct. PT evaluated and discharged patient. Formal speech or swallow evaluation not indicated at this time. Will complete orders.  Ian Avendaño M.S. CCC-SLP

## 2023-03-06 NOTE — PROGRESS NOTES
SHASHANK:    RUR 8%    Disposition: Home with family. Transportation: Spouse    Follow up: PCP/Specialist    Primary contact: Lizett Sánchez [phone]    Care Management Interventions  Mode of Transport at Discharge: Other (see comment) (Spouse)  Discharge Durable Medical Equipment: No  Physical Therapy Consult: Yes  Occupational Therapy Consult: Yes  Speech Therapy Consult: Yes  Support Systems: Spouse/Significant Other  Confirm Follow Up Transport: Family  The Patient and/or Patient Representative was Provided with a Choice of Provider and Agrees with the Discharge Plan?: Yes  Discharge Location  Patient Expects to be Discharged to[de-identified] Home    Reason for Admission:  Dizziness                     RUR Score: 8%                    Plan for utilizing home health:     No orders     PCP: First and Last name:  Lupe Velasquez MD     Name of Practice:    Are you a current patient: Yes/No: Yes   Approximate date of last visit: 1/27   Can you participate in a virtual visit with your PCP: Yes                    Current Advanced Directive/Advance Care Plan: Full Code      Healthcare Decision Maker:   Click here to complete [address] including selection of the Healthcare Decision Maker Relationship (ie \"Primary\")                             Transition of Care Plan:     CM met with patient to introduce CM role, verify demographics and begin discharge planning. Patient lives at home with her  in a 2 story home. There are 14 steps to get to the 2nd level bedroom. Patient is independent with ADLs and drives. Pharmacy: [address]    DME: None    No history of HH or Rehab    Insurance verified.     Beatriz Rose RN/CRM  [phone]

## 2023-03-06 NOTE — ED NOTES
TRANSFER - OUT REPORT:    Verbal report given to ELZA Leong(name) on Elder Estrada  being transferred to [facility](unit) for routine progression of care       Report consisted of patients Situation, Background, Assessment and   Recommendations(SBAR). Information from the following report(s) SBAR, Kardex, ED Summary, MAR, and Recent Results was reviewed with the receiving nurse. Lines:   Peripheral IV 03/05/23 Right Antecubital (Active)   Site Assessment Clean, dry, & intact 03/05/23 0829   Phlebitis Assessment 0 03/05/23 0829   Infiltration Assessment 0 03/05/23 0829   Dressing Status Clean, dry, & intact 03/05/23 9506        Opportunity for questions and clarification was provided.       Patient transported with:   [employer]

## 2023-03-06 NOTE — CONSULTS
[facility]                    Cardiology Care Note     [x]Initial Encounter     []Follow-up    Patient Name: Mago Jett - OOH:[id number] - Z:[id number]  Primary Cardiologist: none  Consulting Cardiologist:      Reason for encounter: elevated troponin, bradycardia    HPI:   Mago Jett is a 67 y.o. female with PMH significant for HTN, thallesemia minor, osteoarthritis who presents from [facility] yesterday with chief c/o dizziness, unsteady gait and N&V. States that yesterday at 6AM she got up and became very dizzy, described as \"room spinning. \"  She laid back down and the dizziness went away. She then got up again and felt the dizziness again but it was less intense. She walked downstairs and then took few sips of coffee and felt nauseated, vomited and then felt better, dizziness resolved. She denied any chest pain, SOB. She denies any prior history of dizziness. At one point she did take her BP and her SBP was 150's. She did have episode of lightheadedness a couple of weeks ago when at [employer] which she attributed to her BP meds but reports the dizziness/room spinning she felt yesterday was different. No history of syncope, no palpitations. She works out nearly daily with walking treadmill, water aerobics and/or lifting weights with no chest pain or SOB. Had recent lipid panel (HDL 44, ) for which she has followup scheduled with PCP. She has had no further episodes of dizziness. HS troponin 112, EKG showed NSR, QTC not truly prolonged given RBBB. SH: never smoker, no ETOH. FH: no FH of early CAD    Subjective:  Mago Jett reports no further dizziness since it resolved after vomiting. No further vomiting noted. She is NSR on telemetry no bradycardia noted. .     Assessment and Plan      Elevated troponin: HS troponin, mildly elevated but trending down (112-71).   EKG shows NSR with RBBB, baseline artifact in multiple leads. No chest pain or exertional symptoms noted. Repeating EKG, but qtc is not truly prolonged on intiial EKG when RBBB is taken into account. (Qtc closer to 475). Echo with NL EF, NWMA, mild MR,  Dizziness: Resolved. Description sounds more like vertigo but will check orthostatics. No evidence of bradycardia or significant block on telemetry review. Echo normal, mild MR,  no evidence of shunt on bubble study. History of HTN: bp currently controlled. RBBB:  No prior EKGs to compare. Hypokalemia: replete. Mg=2.2    Further plan per     Saw and evaluated pt and agree with above assessment and plan. Pt with dizziness, vertiginous quality with spinning. No recent or current exertional chest pain, breathing ok. Compensated on exam. Echo was normal. Troponin trivially elevated, likely due to labile BP. Discussed findings with pt and family. No cardiac contribution to pt presentation and cardiac work up unrevealing. No additional cardiac evaluation indicated at this time and will sign off. Rod Cui MD       ____________________________________________________________    Cardiac testing  03/05/23    ECHO ADULT COMPLETE 03/06/2023 3/6/2023    Interpretation Summary    Bubble study: No interatrial shunt visualized with color Doppler. Agitated saline study was negative with and without provocation. Left Ventricle: Normal left ventricular systolic function with a visually estimated EF of 60 - 65%. Left ventricle size is normal. Increased wall thickness. Findings consistent with mild concentric hypertrophy. Normal wall motion. Mitral Valve: Mild regurgitation.     Signed by: Rod Cui MD on 3/6/2023 11:24 AM      Most recent HS troponins:  Recent Labs     03/06/23  0950 03/05/23  0819   TROPHS 71* 112*       ECG: NSR with RBBB, prolonged Qtc    Review of Systems:    [x]All other systems reviewed and all negative except as written in HPI    [] Patient unable to provide secondary to condition    Past Medical History:   Diagnosis Date    Hypertension     Thalassemia      History reviewed. No pertinent surgical history. Social Hx:  reports that she has never smoked. She has never used smokeless tobacco. She reports that she does not currently use alcohol. She reports that she does not use drugs. Family Hx: family history is not on file. Allergies   Allergen Reactions    Sporanox [Itraconazole] Unknown (comments)          OBJECTIVE:  Wt Readings from Last 3 Encounters:   03/06/23 78.8 kg (173 lb 11.6 oz)   01/07/22 74.8 kg (165 lb)   12/03/21 73.5 kg (162 lb)     No intake or output data in the 24 hours ending 03/06/23 1151    Physical Exam:    Vitals:   Vitals:    03/06/23 0829 03/06/23 0917 03/06/23 0945 03/06/23 1000   BP: (!) 164/88 (!) 146/70 131/77    Pulse:  64 66 76   Resp:  16 14    Temp:   97.5 °F (36.4 °C)    SpO2:   97%    Weight: 78.8 kg (173 lb 11.6 oz)      Height: 5' 2\" (1.575 m)        Telemetry: NSR    Gen: Well-developed, well-nourished, in no acute distress  Neck: Supple, No JVD, No Carotid Bruit  Resp: No accessory muscle use, Clear breath sounds, No rales or rhonchi  Card: Regular Rate,Rythm, Normal S1, S2, No murmurs, rubs or gallop. No thrills. Abd:   Soft, non-tender, non-distended, BS+   MSK: No cyanosis  Skin: No rashes    Neuro: Moving all four extremities, follows commands appropriately  Psych: Good insight, oriented to person, place, alert, Nml Affect  LE: No edema    Data Review:     Radiology:   XR Results (most recent):  No results found for this or any previous visit.       Recent Labs     03/06/23  0950 03/05/23  0819    145   K 3.2* 3.5    107   CO2 28 29   BUN 13 23*   CREA 0.74 0.73   * 128*   CA 9.5 8.8     Recent Labs     03/06/23  0950 03/05/23  0819   WBC 8.8 8.3   HGB 11.4* 10.7*   HCT 37.8 35.3    223     Recent Labs     03/06/23  0950   PTP 11.1   INR 1.1   AP 64     No results for input(s): CHOL, LDLC in the last 72 hours.    No lab exists for component: TGL, HDLC,  HBA1C      Current meds:    Current Facility-Administered Medications:     aspirin chewable tablet 81 mg, 81 mg, Oral, DAILY, Pegram, Frankey Kells, MD, 81 mg at 03/06/23 0840    lisinopriL (PRINIVIL, ZESTRIL) tablet 40 mg, 40 mg, Oral, DAILY, Pegram, Frankey Kells, MD, 40 mg at 03/06/23 0840    calcium-vitamin D (OS-NICOLE +D3) 500 mg-200 unit per tablet 1 Tablet, 1 Tablet, Oral, DAILY, Pegram, Frankey Kells, MD, 1 Tablet at 03/06/23 0840    sodium chloride (NS) flush 5-40 mL, 5-40 mL, IntraVENous, Q8H, Lee Remy MD, 10 mL at 03/06/23 0533    sodium chloride (NS) flush 5-40 mL, 5-40 mL, IntraVENous, PRN, Pegram, Frankey Kells, MD    acetaminophen (TYLENOL) tablet 650 mg, 650 mg, Oral, Q6H PRN **OR** acetaminophen (TYLENOL) suppository 650 mg, 650 mg, Rectal, Q6H PRN, Pegram, Frankey Kells, MD    polyethylene glycol (MIRALAX) packet 17 g, 17 g, Oral, DAILY PRN, Lee Remy MD    ondansetron (ZOFRAN ODT) tablet 4 mg, 4 mg, Oral, Q8H PRN **OR** ondansetron (ZOFRAN) injection 4 mg, 4 mg, IntraVENous, Q6H PRN, Pegram, Frankey Kells, MD Mallie Furrow.  RAYMOND Brown    [facility]  Call center: (j) [phone] (W) [phone]      Marah Guo MD

## 2023-03-07 LAB
ATRIAL RATE: 61 BPM
CALCULATED P AXIS, ECG09: 12 DEGREES
CALCULATED R AXIS, ECG10: -6 DEGREES
CALCULATED T AXIS, ECG11: 32 DEGREES
DIAGNOSIS, 93000: NORMAL
P-R INTERVAL, ECG05: 176 MS
Q-T INTERVAL, ECG07: 448 MS
QRS DURATION, ECG06: 134 MS
QTC CALCULATION (BEZET), ECG08: 450 MS
VENTRICULAR RATE, ECG03: 61 BPM

## 2023-03-07 NOTE — DISCHARGE SUMMARY
Discharge Summary       PATIENT ID: Elder Estrada  MRN: [id number]   YOB: 1950    DATE OF ADMISSION: 3/5/2023  8:10 AM    DATE OF DISCHARGE: 3/5/2023    PRIMARY CARE PROVIDER: Patrica Carver MD     ATTENDING PHYSICIAN: Vidal Jett MD   DISCHARGING PROVIDER: Leonardo Samuels MD      CONSULTATIONS: IP CONSULT TO NEUROLOGY  IP CONSULT TO NEUROLOGY  IP CONSULT TO CARDIOLOGY    PROCEDURES/SURGERIES: * No surgery found *    ADMISSION SUMMARY   Elder Estrada is a 67 y.o. female with past medical history of hypertension and thalassemia minor presented to as a direct admission/transfer [facility] with reported dizziness, unsteady gait, nausea, and vomiting. Symptom onset reportedly began ~ 2 hours prior to going to ED. Symptoms remained constant, moderate to severe, worsening when sitting up, and decreased at rest with associated nausea and vomiting. She initially went to [facility]. Work-up included labs which were abnormal for hemoglobin 10.7, MCV 61.4, BUN 23, high-sensitivity troponin 112. CT head without IV contrast showed no acute process. CTA head and neck with IV contrast showed no acute process. ED consulted  tele-neurologist.  12 lead EKG shows sinus bradycardia, RBBB, nonspecific ST-T wave changes at 59 bpm.  ED ordered aspirin 325 mg p.o. x1 dose. There was concern that patient had prolonged QTc on EKG and there is other concerns for possible symptomatic bradycardia according to the ER note. Patient was then transferred to [facility].  At current, patient notes dizziness has resolved. She does not report any slurred speech, facial droop, numbness, paresthesias, focal weakness, visual disturbance.   She does not recall having similar symptoms in the past.       [address] COURSE:      dizziness  with episode of nausea   Possible TIA   -symptoms all resolved   Brain MRI neg for acute finding, has moderate chronic ischemi white matter changes. Neurologist consulted. Possible TIA, cannot fully exclude peripheral etiology, but atypical.  Given HTN, HLD, and CIWM changes on MRI, recommend ASA 81mg daily. Lipid profile, HgbA1C are not completed yet. Based on PCP , recommend Lipitor 40mg daily, goal LDL<70. Stable for d/c.      Elevated troponin: HS troponin, mildly elevated but trending down (112-71). EKG shows NSR with RBBB, baseline artifact in multiple leads. No chest pain or exertional symptoms noted. Repeating EKG, but qtc is not truly prolonged on intiial EKG when RBBB is taken into account. (Qtc closer to 475). Echo with NL EF, NWMA, mild MR,  No further work up need per cardiologist     HTN: bp currently controlled. ADDITIONAL CARE RECOMMENDATIONS:   Follow up PCP in 4-6 weeks for lipid and LFT CHECKING   BP MONITOR   Follow up PCP, diabetes screen     DIET: Cardiac Diet    ACTIVITY: Activity as tolerated        NOTIFY YOUR PHYSICIAN FOR ANY OF THE FOLLOWING:   Fever over 101 degrees for 24 hours. Chest pain, shortness of breath, fever, chills, nausea, vomiting, diarrhea, change in mentation, falling, weakness, bleeding. Severe pain or pain not relieved by medications, as well as any other signs or symptoms that you may have questions about. FOLLOW UP APPOINTMENTS:    Follow-up Information       Follow up With Specialties Details Why Contact Info    Zachary March MD Family Medicine   [address] [place] [address]  [phone]               DISCHARGE MEDICATIONS:  Discharge Medication List as of 3/6/2023  4:58 PM        START taking these medications    Details   atorvastatin (LIPITOR) 40 mg tablet Take 1 Tablet by mouth daily. , Normal, Disp-30 Tablet, R-0           CONTINUE these medications which have NOT CHANGED    Details   aspirin delayed-release 81 mg tablet Take 1 Tablet by mouth daily. , OTC      calcium-cholecalciferol, d3, (CALCIUM 600 + D) 600-125 mg-unit tab Take  by mouth., Historical Med      clobetasoL (TEMOVATE) 0.05 % topical cream Apply  to affected area two (2) times a day., Historical Med      augmented betamethasone dipropionate (DIPROLENE-AF) 0.05 % topical cream Historical Med      chlorthalidone (HYGROTON) 25 mg tablet 12.5 mg., Historical Med      ramipriL (ALTACE) 10 mg capsule Historical Med             DISPOSITION:    Home With:   OT  PT  HH  RN       Long term SNF/Inpatient Rehab    Independent/assisted living    Hospice   x Other:     PATIENT CONDITION AT DISCHARGE:     Functional status    Poor     Deconditioned    x Independent      Cognition    x Lucid     Forgetful     Dementia      Catheters/lines (plus indication)    Howe     PICC     PEG    x None      Code status   x  Full code     DNR      PHYSICAL EXAMINATION AT DISCHARGE:    General:          Alert, cooperative, no distress, appears stated age. HEENT:           Atraumatic, anicteric sclerae, pink conjunctivae                          No oral ulcers, mucosa moist, throat clear, dentition fair  Neck:               Supple, symmetrical  Lungs:             Clear to auscultation bilaterally. No Wheezing or Rhonchi. No rales. Chest wall:      No tenderness  No Accessory muscle use. Heart:              Regular  rhythm,  No  murmur   No edema  Abdomen:        Soft, non-tender. Not distended. Bowel sounds normal  Extremities:     No cyanosis. No clubbing,                            Skin turgor normal, Capillary refill normal  Skin:                Not pale. Not Jaundiced  No rashes   Psych:             Not anxious or agitated.   Neurologic:      Alert, moves all extremities, answers questions appropriately and responds to commands       CHRONIC MEDICAL DIAGNOSES:  Problem List as of 3/6/2023 Date Reviewed: 1/7/2022            Codes Class Noted - Resolved    Dizziness ICD-10-CM: R42  ICD-9-CM: 780.4  3/5/2023 - Present           Greater than 30  minutes were spent with the patient on counseling and coordination of care    Signed:   Nahun Reddy MD  3/6/2023  7:33 PM

## 2023-05-16 RX ORDER — RAMIPRIL 10 MG/1
CAPSULE ORAL
COMMUNITY
Start: 2022-07-13

## 2023-05-16 RX ORDER — CHLORTHALIDONE 25 MG/1
12.5 TABLET ORAL
COMMUNITY
Start: 2022-02-01

## 2023-05-16 RX ORDER — BETAMETHASONE DIPROPIONATE 0.5 MG/G
CREAM TOPICAL
COMMUNITY
Start: 2022-07-21

## 2023-05-16 RX ORDER — ATORVASTATIN CALCIUM 40 MG/1
40 TABLET, FILM COATED ORAL DAILY
COMMUNITY
Start: 2023-03-06

## 2023-05-16 RX ORDER — ASPIRIN 81 MG/1
81 TABLET ORAL DAILY
COMMUNITY
Start: 2023-03-06

## 2023-05-16 RX ORDER — CLOBETASOL PROPIONATE 0.5 MG/G
CREAM TOPICAL 2 TIMES DAILY
COMMUNITY

## 2023-11-10 ENCOUNTER — APPOINTMENT (OUTPATIENT)
Facility: HOSPITAL | Age: 73
DRG: 321 | End: 2023-11-10
Payer: MEDICARE

## 2023-11-10 ENCOUNTER — HOSPITAL ENCOUNTER (INPATIENT)
Facility: HOSPITAL | Age: 73
LOS: 2 days | Discharge: HOME | DRG: 321 | End: 2023-11-12
Attending: EMERGENCY MEDICINE | Admitting: INTERNAL MEDICINE
Payer: MEDICARE

## 2023-11-10 DIAGNOSIS — I21.3 ST ELEVATION MYOCARDIAL INFARCTION (STEMI), UNSPECIFIED ARTERY (HCC): Primary | ICD-10-CM

## 2023-11-10 DIAGNOSIS — I49.01 VENTRICULAR FIBRILLATION (HCC): ICD-10-CM

## 2023-11-10 DIAGNOSIS — I46.9 CARDIAC ARREST (HCC): ICD-10-CM

## 2023-11-10 DIAGNOSIS — I21.3 STEMI (ST ELEVATION MYOCARDIAL INFARCTION) (HCC): ICD-10-CM

## 2023-11-10 LAB
ALBUMIN SERPL-MCNC: 3.6 G/DL (ref 3.5–5)
ALBUMIN SERPL-MCNC: 4 G/DL (ref 3.5–5)
ALBUMIN/GLOB SERPL: 1.1 (ref 1.1–2.2)
ALBUMIN/GLOB SERPL: 1.1 (ref 1.1–2.2)
ALP SERPL-CCNC: 67 U/L (ref 45–117)
ALP SERPL-CCNC: 73 U/L (ref 45–117)
ALT SERPL-CCNC: 175 U/L (ref 12–78)
ALT SERPL-CCNC: 40 U/L (ref 12–78)
ANION GAP BLD CALC-SCNC: 10 (ref 10–20)
ANION GAP BLD CALC-SCNC: 14 (ref 10–20)
ANION GAP SERPL CALC-SCNC: 10 MMOL/L (ref 5–15)
ANION GAP SERPL CALC-SCNC: 11 MMOL/L (ref 5–15)
ARTERIAL PATENCY WRIST A: POSITIVE
AST SERPL-CCNC: 20 U/L (ref 15–37)
AST SERPL-CCNC: 274 U/L (ref 15–37)
BASE DEFICIT BLD-SCNC: 3.5 MMOL/L
BASE DEFICIT BLD-SCNC: 3.5 MMOL/L
BASE DEFICIT BLD-SCNC: 3.9 MMOL/L
BASOPHILS # BLD: 0.1 K/UL (ref 0–0.1)
BASOPHILS NFR BLD: 1 % (ref 0–1)
BDY SITE: ABNORMAL
BILIRUB SERPL-MCNC: 0.8 MG/DL (ref 0.2–1)
BILIRUB SERPL-MCNC: 0.8 MG/DL (ref 0.2–1)
BUN SERPL-MCNC: 17 MG/DL (ref 6–20)
BUN SERPL-MCNC: 17 MG/DL (ref 6–20)
BUN/CREAT SERPL: 22 (ref 12–20)
BUN/CREAT SERPL: 26 (ref 12–20)
CA-I BLD-MCNC: 1.16 MMOL/L (ref 1.12–1.32)
CA-I BLD-MCNC: 1.3 MMOL/L (ref 1.12–1.32)
CALCIUM SERPL-MCNC: 8.5 MG/DL (ref 8.5–10.1)
CALCIUM SERPL-MCNC: 9.8 MG/DL (ref 8.5–10.1)
CHLORIDE BLD-SCNC: 102 MMOL/L (ref 100–108)
CHLORIDE BLD-SCNC: 99 MMOL/L (ref 100–108)
CHLORIDE SERPL-SCNC: 102 MMOL/L (ref 97–108)
CHLORIDE SERPL-SCNC: 96 MMOL/L (ref 97–108)
CO2 BLD-SCNC: 20 MMOL/L (ref 19–24)
CO2 BLD-SCNC: 24 MMOL/L (ref 19–24)
CO2 SERPL-SCNC: 18 MMOL/L (ref 21–32)
CO2 SERPL-SCNC: 27 MMOL/L (ref 21–32)
CREAT SERPL-MCNC: 0.66 MG/DL (ref 0.55–1.02)
CREAT SERPL-MCNC: 0.77 MG/DL (ref 0.55–1.02)
CREAT UR-MCNC: 0.6 MG/DL (ref 0.6–1.3)
CREAT UR-MCNC: 0.6 MG/DL (ref 0.6–1.3)
DIFFERENTIAL METHOD BLD: ABNORMAL
ECHO BSA: 1.88 M2
EKG ATRIAL RATE: 104 BPM
EKG ATRIAL RATE: 107 BPM
EKG ATRIAL RATE: 69 BPM
EKG ATRIAL RATE: 84 BPM
EKG DIAGNOSIS: NORMAL
EKG P AXIS: -74 DEGREES
EKG P AXIS: 62 DEGREES
EKG P AXIS: 75 DEGREES
EKG P-R INTERVAL: 124 MS
EKG P-R INTERVAL: 172 MS
EKG P-R INTERVAL: 188 MS
EKG P-R INTERVAL: 280 MS
EKG Q-T INTERVAL: 356 MS
EKG Q-T INTERVAL: 388 MS
EKG Q-T INTERVAL: 442 MS
EKG Q-T INTERVAL: 450 MS
EKG QRS DURATION: 138 MS
EKG QRS DURATION: 138 MS
EKG QRS DURATION: 144 MS
EKG QRS DURATION: 154 MS
EKG QTC CALCULATION (BAZETT): 473 MS
EKG QTC CALCULATION (BAZETT): 475 MS
EKG QTC CALCULATION (BAZETT): 510 MS
EKG QTC CALCULATION (BAZETT): 531 MS
EKG R AXIS: 0 DEGREES
EKG R AXIS: 2 DEGREES
EKG R AXIS: 28 DEGREES
EKG R AXIS: 55 DEGREES
EKG T AXIS: 16 DEGREES
EKG T AXIS: 33 DEGREES
EKG T AXIS: 36 DEGREES
EKG T AXIS: 41 DEGREES
EKG VENTRICULAR RATE: 104 BPM
EKG VENTRICULAR RATE: 107 BPM
EKG VENTRICULAR RATE: 69 BPM
EKG VENTRICULAR RATE: 84 BPM
EOSINOPHIL # BLD: 0.1 K/UL (ref 0–0.4)
EOSINOPHIL NFR BLD: 1 % (ref 0–7)
ERYTHROCYTE [DISTWIDTH] IN BLOOD BY AUTOMATED COUNT: 17.7 % (ref 11.5–14.5)
ERYTHROCYTE [DISTWIDTH] IN BLOOD BY AUTOMATED COUNT: 18 % (ref 11.5–14.5)
GAS FLOW.O2 O2 DELIVERY SYS: ABNORMAL
GLOBULIN SER CALC-MCNC: 3.4 G/DL (ref 2–4)
GLOBULIN SER CALC-MCNC: 3.8 G/DL (ref 2–4)
GLUCOSE BLD STRIP.AUTO-MCNC: 202 MG/DL (ref 74–106)
GLUCOSE BLD STRIP.AUTO-MCNC: 290 MG/DL (ref 74–106)
GLUCOSE SERPL-MCNC: 142 MG/DL (ref 65–100)
GLUCOSE SERPL-MCNC: 213 MG/DL (ref 65–100)
HCO3 BLD-SCNC: 20.8 MMOL/L (ref 22–26)
HCO3 BLDA-SCNC: 20 MMOL/L
HCO3 BLDA-SCNC: 24 MMOL/L
HCT VFR BLD AUTO: 36.3 % (ref 35–47)
HCT VFR BLD AUTO: 37.1 % (ref 35–47)
HGB BLD-MCNC: 11.1 G/DL (ref 11.5–16)
HGB BLD-MCNC: 11.5 G/DL (ref 11.5–16)
IMM GRANULOCYTES # BLD AUTO: 0.1 K/UL (ref 0–0.04)
IMM GRANULOCYTES NFR BLD AUTO: 1 % (ref 0–0.5)
LACTATE BLD-SCNC: 3.21 MMOL/L (ref 0.4–2)
LACTATE BLD-SCNC: 6.71 MMOL/L (ref 0.4–2)
LACTATE SERPL-SCNC: 1.6 MMOL/L (ref 0.4–2)
LYMPHOCYTES # BLD: 1.5 K/UL (ref 0.8–3.5)
LYMPHOCYTES NFR BLD: 12 % (ref 12–49)
MAGNESIUM SERPL-MCNC: 1.8 MG/DL (ref 1.6–2.4)
MCH RBC QN AUTO: 18.4 PG (ref 26–34)
MCH RBC QN AUTO: 18.5 PG (ref 26–34)
MCHC RBC AUTO-ENTMCNC: 30.6 G/DL (ref 30–36.5)
MCHC RBC AUTO-ENTMCNC: 31 G/DL (ref 30–36.5)
MCV RBC AUTO: 59.6 FL (ref 80–99)
MCV RBC AUTO: 60.2 FL (ref 80–99)
MONOCYTES # BLD: 0.8 K/UL (ref 0–1)
MONOCYTES NFR BLD: 6 % (ref 5–13)
NEUTS SEG # BLD: 10.3 K/UL (ref 1.8–8)
NEUTS SEG NFR BLD: 79 % (ref 32–75)
NRBC # BLD: 0.02 K/UL (ref 0–0.01)
NRBC # BLD: 0.05 K/UL (ref 0–0.01)
NRBC BLD-RTO: 0.2 PER 100 WBC
NRBC BLD-RTO: 0.2 PER 100 WBC
NT PRO BNP: 383 PG/ML (ref 0–125)
NT PRO BNP: 980 PG/ML
PCO2 BLD: 31.2 MMHG (ref 35–45)
PCO2 BLD: 34.7 MMHG (ref 35–45)
PCO2 BLDV: 52.4 MMHG (ref 41–51)
PH BLD: 7.39 (ref 7.35–7.45)
PH BLD: 7.42 (ref 7.35–7.45)
PH BLDV: 7.27 (ref 7.32–7.42)
PHOSPHATE SERPL-MCNC: 3.2 MG/DL (ref 2.6–4.7)
PLATELET # BLD AUTO: 324 K/UL (ref 150–400)
PLATELET # BLD AUTO: 347 K/UL (ref 150–400)
PMV BLD AUTO: 10.3 FL (ref 8.9–12.9)
PMV BLD AUTO: 9.8 FL (ref 8.9–12.9)
PO2 BLD: 241 MMHG (ref 80–100)
PO2 BLD: 94 MMHG (ref 80–100)
PO2 BLDV: 42 MMHG (ref 25–40)
POTASSIUM BLD-SCNC: 2.6 MMOL/L (ref 3.5–5.5)
POTASSIUM BLD-SCNC: 4.1 MMOL/L (ref 3.5–5.5)
POTASSIUM SERPL-SCNC: 3 MMOL/L (ref 3.5–5.1)
POTASSIUM SERPL-SCNC: 3.2 MMOL/L (ref 3.5–5.1)
PROT SERPL-MCNC: 7 G/DL (ref 6.4–8.2)
PROT SERPL-MCNC: 7.8 G/DL (ref 6.4–8.2)
RBC # BLD AUTO: 6.03 M/UL (ref 3.8–5.2)
RBC # BLD AUTO: 6.23 M/UL (ref 3.8–5.2)
RBC MORPH BLD: ABNORMAL
SAO2 % BLD: 100 %
SAO2 % BLD: 70 %
SAO2 % BLD: 97.3 % (ref 92–97)
SERVICE CMNT-IMP: ABNORMAL
SODIUM BLD-SCNC: 132 MMOL/L (ref 136–145)
SODIUM BLD-SCNC: 137 MMOL/L (ref 136–145)
SODIUM SERPL-SCNC: 131 MMOL/L (ref 136–145)
SODIUM SERPL-SCNC: 133 MMOL/L (ref 136–145)
SPECIMEN SITE: ABNORMAL
SPECIMEN SITE: ABNORMAL
SPECIMEN TYPE: ABNORMAL
TROPONIN I SERPL HS-MCNC: 513 NG/L (ref 0–51)
TROPONIN I SERPL HS-MCNC: ABNORMAL NG/L (ref 0–51)
WBC # BLD AUTO: 12.9 K/UL (ref 3.6–11)
WBC # BLD AUTO: 27.7 K/UL (ref 3.6–11)

## 2023-11-10 PROCEDURE — 93005 ELECTROCARDIOGRAM TRACING: CPT | Performed by: EMERGENCY MEDICINE

## 2023-11-10 PROCEDURE — 31500 INSERT EMERGENCY AIRWAY: CPT

## 2023-11-10 PROCEDURE — 6360000002 HC RX W HCPCS: Performed by: INTERNAL MEDICINE

## 2023-11-10 PROCEDURE — C1713 ANCHOR/SCREW BN/BN,TIS/BN: HCPCS | Performed by: INTERNAL MEDICINE

## 2023-11-10 PROCEDURE — 2580000003 HC RX 258: Performed by: INTERNAL MEDICINE

## 2023-11-10 PROCEDURE — 82803 BLOOD GASES ANY COMBINATION: CPT

## 2023-11-10 PROCEDURE — 83605 ASSAY OF LACTIC ACID: CPT

## 2023-11-10 PROCEDURE — 36415 COLL VENOUS BLD VENIPUNCTURE: CPT

## 2023-11-10 PROCEDURE — 99291 CRITICAL CARE FIRST HOUR: CPT

## 2023-11-10 PROCEDURE — 84484 ASSAY OF TROPONIN QUANT: CPT

## 2023-11-10 PROCEDURE — C1769 GUIDE WIRE: HCPCS | Performed by: INTERNAL MEDICINE

## 2023-11-10 PROCEDURE — 71045 X-RAY EXAM CHEST 1 VIEW: CPT

## 2023-11-10 PROCEDURE — 6360000002 HC RX W HCPCS: Performed by: EMERGENCY MEDICINE

## 2023-11-10 PROCEDURE — 2500000003 HC RX 250 WO HCPCS: Performed by: INTERNAL MEDICINE

## 2023-11-10 PROCEDURE — 2000000000 HC ICU R&B

## 2023-11-10 PROCEDURE — 85025 COMPLETE CBC W/AUTO DIFF WBC: CPT

## 2023-11-10 PROCEDURE — 4A023N7 MEASUREMENT OF CARDIAC SAMPLING AND PRESSURE, LEFT HEART, PERCUTANEOUS APPROACH: ICD-10-PCS | Performed by: INTERNAL MEDICINE

## 2023-11-10 PROCEDURE — 83880 ASSAY OF NATRIURETIC PEPTIDE: CPT

## 2023-11-10 PROCEDURE — 2709999900 HC NON-CHARGEABLE SUPPLY: Performed by: INTERNAL MEDICINE

## 2023-11-10 PROCEDURE — 83735 ASSAY OF MAGNESIUM: CPT

## 2023-11-10 PROCEDURE — C1725 CATH, TRANSLUMIN NON-LASER: HCPCS | Performed by: INTERNAL MEDICINE

## 2023-11-10 PROCEDURE — 93005 ELECTROCARDIOGRAM TRACING: CPT | Performed by: INTERNAL MEDICINE

## 2023-11-10 PROCEDURE — 82330 ASSAY OF CALCIUM: CPT

## 2023-11-10 PROCEDURE — 0BH17EZ INSERTION OF ENDOTRACHEAL AIRWAY INTO TRACHEA, VIA NATURAL OR ARTIFICIAL OPENING: ICD-10-PCS | Performed by: INTERNAL MEDICINE

## 2023-11-10 PROCEDURE — 96374 THER/PROPH/DIAG INJ IV PUSH: CPT

## 2023-11-10 PROCEDURE — 85027 COMPLETE CBC AUTOMATED: CPT

## 2023-11-10 PROCEDURE — 2580000003 HC RX 258: Performed by: EMERGENCY MEDICINE

## 2023-11-10 PROCEDURE — 93010 ELECTROCARDIOGRAM REPORT: CPT | Performed by: INTERNAL MEDICINE

## 2023-11-10 PROCEDURE — C1894 INTRO/SHEATH, NON-LASER: HCPCS | Performed by: INTERNAL MEDICINE

## 2023-11-10 PROCEDURE — 2500000003 HC RX 250 WO HCPCS

## 2023-11-10 PROCEDURE — B2111ZZ FLUOROSCOPY OF MULTIPLE CORONARY ARTERIES USING LOW OSMOLAR CONTRAST: ICD-10-PCS | Performed by: INTERNAL MEDICINE

## 2023-11-10 PROCEDURE — C1874 STENT, COATED/COV W/DEL SYS: HCPCS | Performed by: INTERNAL MEDICINE

## 2023-11-10 PROCEDURE — 5A1935Z RESPIRATORY VENTILATION, LESS THAN 24 CONSECUTIVE HOURS: ICD-10-PCS | Performed by: INTERNAL MEDICINE

## 2023-11-10 PROCEDURE — 84132 ASSAY OF SERUM POTASSIUM: CPT

## 2023-11-10 PROCEDURE — C9606 PERC D-E COR REVASC W AMI S: HCPCS | Performed by: INTERNAL MEDICINE

## 2023-11-10 PROCEDURE — 93454 CORONARY ARTERY ANGIO S&I: CPT | Performed by: INTERNAL MEDICINE

## 2023-11-10 PROCEDURE — C1887 CATHETER, GUIDING: HCPCS | Performed by: INTERNAL MEDICINE

## 2023-11-10 PROCEDURE — 82947 ASSAY GLUCOSE BLOOD QUANT: CPT

## 2023-11-10 PROCEDURE — 6370000000 HC RX 637 (ALT 250 FOR IP): Performed by: INTERNAL MEDICINE

## 2023-11-10 PROCEDURE — 80053 COMPREHEN METABOLIC PANEL: CPT

## 2023-11-10 PROCEDURE — 94002 VENT MGMT INPAT INIT DAY: CPT

## 2023-11-10 PROCEDURE — C1760 CLOSURE DEV, VASC: HCPCS | Performed by: INTERNAL MEDICINE

## 2023-11-10 PROCEDURE — 84100 ASSAY OF PHOSPHORUS: CPT

## 2023-11-10 PROCEDURE — 84295 ASSAY OF SERUM SODIUM: CPT

## 2023-11-10 DEVICE — STENT ONYXNG30018UX ONYX 3.00X18RX
Type: IMPLANTABLE DEVICE | Status: FUNCTIONAL
Brand: ONYX FRONTIER™

## 2023-11-10 RX ORDER — CLOPIDOGREL BISULFATE 75 MG/1
75 TABLET ORAL DAILY
Status: DISCONTINUED | OUTPATIENT
Start: 2023-11-11 | End: 2023-11-12 | Stop reason: HOSPADM

## 2023-11-10 RX ORDER — EPINEPHRINE IN SOD CHLOR,ISO 1 MG/10 ML
1 SYRINGE (ML) INTRAVENOUS ONCE
Status: DISCONTINUED | OUTPATIENT
Start: 2023-11-10 | End: 2023-11-11

## 2023-11-10 RX ORDER — HEPARIN SODIUM 200 [USP'U]/100ML
INJECTION, SOLUTION INTRAVENOUS CONTINUOUS PRN
Status: COMPLETED | OUTPATIENT
Start: 2023-11-10 | End: 2023-11-10

## 2023-11-10 RX ORDER — SUCCINYLCHOLINE/SOD CL,ISO/PF 200MG/10ML
SYRINGE (ML) INTRAVENOUS
Status: DISPENSED
Start: 2023-11-10 | End: 2023-11-10

## 2023-11-10 RX ORDER — ETOMIDATE 2 MG/ML
INJECTION INTRAVENOUS
Status: COMPLETED
Start: 2023-11-10 | End: 2023-11-10

## 2023-11-10 RX ORDER — PROPOFOL 10 MG/ML
5-50 INJECTION, EMULSION INTRAVENOUS CONTINUOUS
Status: DISCONTINUED | OUTPATIENT
Start: 2023-11-10 | End: 2023-11-11

## 2023-11-10 RX ORDER — MAGNESIUM SULFATE IN WATER 40 MG/ML
2000 INJECTION, SOLUTION INTRAVENOUS PRN
Status: DISCONTINUED | OUTPATIENT
Start: 2023-11-10 | End: 2023-11-12 | Stop reason: HOSPADM

## 2023-11-10 RX ORDER — LIDOCAINE HYDROCHLORIDE ANHYDROUS AND DEXTROSE MONOHYDRATE 5; 400 G/100ML; MG/100ML
1 INJECTION, SOLUTION INTRAVENOUS CONTINUOUS
Status: DISCONTINUED | OUTPATIENT
Start: 2023-11-10 | End: 2023-11-10 | Stop reason: SDUPTHER

## 2023-11-10 RX ORDER — POTASSIUM CHLORIDE 750 MG/1
40 TABLET, FILM COATED, EXTENDED RELEASE ORAL EVERY 4 HOURS
Status: COMPLETED | OUTPATIENT
Start: 2023-11-10 | End: 2023-11-10

## 2023-11-10 RX ORDER — ROCURONIUM BROMIDE 10 MG/ML
INJECTION, SOLUTION INTRAVENOUS
Status: COMPLETED
Start: 2023-11-10 | End: 2023-11-10

## 2023-11-10 RX ORDER — CLOPIDOGREL 300 MG/1
TABLET, FILM COATED ORAL PRN
Status: DISCONTINUED | OUTPATIENT
Start: 2023-11-10 | End: 2023-11-10 | Stop reason: HOSPADM

## 2023-11-10 RX ORDER — ONDANSETRON 4 MG/1
4 TABLET, ORALLY DISINTEGRATING ORAL EVERY 8 HOURS PRN
Status: DISCONTINUED | OUTPATIENT
Start: 2023-11-10 | End: 2023-11-12 | Stop reason: HOSPADM

## 2023-11-10 RX ORDER — SODIUM CHLORIDE 0.9 % (FLUSH) 0.9 %
5-40 SYRINGE (ML) INJECTION PRN
Status: DISCONTINUED | OUTPATIENT
Start: 2023-11-10 | End: 2023-11-12 | Stop reason: HOSPADM

## 2023-11-10 RX ORDER — ASPIRIN 81 MG/1
81 TABLET, CHEWABLE ORAL DAILY
Status: DISCONTINUED | OUTPATIENT
Start: 2023-11-11 | End: 2023-11-12 | Stop reason: HOSPADM

## 2023-11-10 RX ORDER — SODIUM CHLORIDE 0.9 % (FLUSH) 0.9 %
5-40 SYRINGE (ML) INJECTION EVERY 12 HOURS SCHEDULED
Status: DISCONTINUED | OUTPATIENT
Start: 2023-11-10 | End: 2023-11-12 | Stop reason: HOSPADM

## 2023-11-10 RX ORDER — CHLORHEXIDINE GLUCONATE ORAL RINSE 1.2 MG/ML
15 SOLUTION DENTAL 2 TIMES DAILY
Status: DISCONTINUED | OUTPATIENT
Start: 2023-11-10 | End: 2023-11-11

## 2023-11-10 RX ORDER — ACETAMINOPHEN 650 MG/1
650 SUPPOSITORY RECTAL EVERY 6 HOURS PRN
Status: DISCONTINUED | OUTPATIENT
Start: 2023-11-10 | End: 2023-11-12 | Stop reason: HOSPADM

## 2023-11-10 RX ORDER — ASPIRIN 300 MG/1
600 SUPPOSITORY RECTAL ONCE
Status: DISCONTINUED | OUTPATIENT
Start: 2023-11-10 | End: 2023-11-11

## 2023-11-10 RX ORDER — BIVALIRUDIN 250 MG/5ML
INJECTION, POWDER, LYOPHILIZED, FOR SOLUTION INTRAVENOUS PRN
Status: DISCONTINUED | OUTPATIENT
Start: 2023-11-10 | End: 2023-11-10 | Stop reason: HOSPADM

## 2023-11-10 RX ORDER — ROCURONIUM BROMIDE 10 MG/ML
70 INJECTION, SOLUTION INTRAVENOUS ONCE
Status: COMPLETED | OUTPATIENT
Start: 2023-11-10 | End: 2023-11-10

## 2023-11-10 RX ORDER — POTASSIUM CHLORIDE 7.45 MG/ML
10 INJECTION INTRAVENOUS
Status: DISPENSED | OUTPATIENT
Start: 2023-11-10 | End: 2023-11-10

## 2023-11-10 RX ORDER — PROPOFOL 10 MG/ML
INJECTION, EMULSION INTRAVENOUS CONTINUOUS PRN
Status: COMPLETED | OUTPATIENT
Start: 2023-11-10 | End: 2023-11-10

## 2023-11-10 RX ORDER — POTASSIUM CHLORIDE 7.45 MG/ML
10 INJECTION INTRAVENOUS PRN
Status: DISCONTINUED | OUTPATIENT
Start: 2023-11-10 | End: 2023-11-12 | Stop reason: HOSPADM

## 2023-11-10 RX ORDER — LIDOCAINE HYDROCHLORIDE ANHYDROUS AND DEXTROSE MONOHYDRATE 5; 400 G/100ML; MG/100ML
1 INJECTION, SOLUTION INTRAVENOUS CONTINUOUS
Status: DISCONTINUED | OUTPATIENT
Start: 2023-11-10 | End: 2023-11-10

## 2023-11-10 RX ORDER — POTASSIUM CHLORIDE 29.8 MG/ML
20 INJECTION INTRAVENOUS PRN
Status: DISCONTINUED | OUTPATIENT
Start: 2023-11-10 | End: 2023-11-12 | Stop reason: HOSPADM

## 2023-11-10 RX ORDER — ONDANSETRON 2 MG/ML
4 INJECTION INTRAMUSCULAR; INTRAVENOUS EVERY 6 HOURS PRN
Status: DISCONTINUED | OUTPATIENT
Start: 2023-11-10 | End: 2023-11-12 | Stop reason: HOSPADM

## 2023-11-10 RX ORDER — SODIUM CHLORIDE 9 MG/ML
INJECTION, SOLUTION INTRAVENOUS PRN
Status: DISCONTINUED | OUTPATIENT
Start: 2023-11-10 | End: 2023-11-12 | Stop reason: HOSPADM

## 2023-11-10 RX ORDER — ASPIRIN 81 MG/1
324 TABLET, CHEWABLE ORAL ONCE
Status: DISCONTINUED | OUTPATIENT
Start: 2023-11-10 | End: 2023-11-10

## 2023-11-10 RX ORDER — LIDOCAINE HYDROCHLORIDE 10 MG/ML
INJECTION, SOLUTION INFILTRATION; PERINEURAL PRN
Status: DISCONTINUED | OUTPATIENT
Start: 2023-11-10 | End: 2023-11-10 | Stop reason: HOSPADM

## 2023-11-10 RX ORDER — ONDANSETRON 2 MG/ML
INJECTION INTRAMUSCULAR; INTRAVENOUS PRN
Status: DISCONTINUED | OUTPATIENT
Start: 2023-11-10 | End: 2023-11-10 | Stop reason: HOSPADM

## 2023-11-10 RX ORDER — POLYETHYLENE GLYCOL 3350 17 G/17G
17 POWDER, FOR SOLUTION ORAL DAILY PRN
Status: DISCONTINUED | OUTPATIENT
Start: 2023-11-10 | End: 2023-11-12 | Stop reason: HOSPADM

## 2023-11-10 RX ORDER — ACETAMINOPHEN 325 MG/1
650 TABLET ORAL EVERY 6 HOURS PRN
Status: DISCONTINUED | OUTPATIENT
Start: 2023-11-10 | End: 2023-11-12 | Stop reason: HOSPADM

## 2023-11-10 RX ADMIN — POTASSIUM CHLORIDE 40 MEQ: 750 TABLET, FILM COATED, EXTENDED RELEASE ORAL at 20:38

## 2023-11-10 RX ADMIN — POTASSIUM CHLORIDE 10 MEQ: 7.46 INJECTION, SOLUTION INTRAVENOUS at 11:47

## 2023-11-10 RX ADMIN — SODIUM CHLORIDE, PRESERVATIVE FREE 10 ML: 5 INJECTION INTRAVENOUS at 20:38

## 2023-11-10 RX ADMIN — ETOMIDATE 10 MG: 2 INJECTION INTRAVENOUS at 11:29

## 2023-11-10 RX ADMIN — AMIODARONE HYDROCHLORIDE 1 MG/MIN: 50 INJECTION, SOLUTION INTRAVENOUS at 11:56

## 2023-11-10 RX ADMIN — PROPOFOL 10 MCG/KG/MIN: 10 INJECTION, EMULSION INTRAVENOUS at 11:42

## 2023-11-10 RX ADMIN — LIDOCAINE HYDROCHLORIDE 1 MG/MIN: 4 INJECTION, SOLUTION INTRAVENOUS at 12:05

## 2023-11-10 RX ADMIN — POTASSIUM CHLORIDE 40 MEQ: 750 TABLET, FILM COATED, EXTENDED RELEASE ORAL at 17:34

## 2023-11-10 RX ADMIN — AMIODARONE HYDROCHLORIDE 1 MG/MIN: 50 INJECTION, SOLUTION INTRAVENOUS at 18:31

## 2023-11-10 RX ADMIN — ROCURONIUM BROMIDE 70 MG: 10 SOLUTION INTRAVENOUS at 11:31

## 2023-11-10 RX ADMIN — ROCURONIUM BROMIDE 70 MG: 10 INJECTION, SOLUTION INTRAVENOUS at 11:31

## 2023-11-10 RX ADMIN — ONDANSETRON 4 MG: 2 INJECTION INTRAMUSCULAR; INTRAVENOUS at 19:15

## 2023-11-10 ASSESSMENT — PULMONARY FUNCTION TESTS
PIF_VALUE: 24
PIF_VALUE: 24

## 2023-11-10 ASSESSMENT — PAIN DESCRIPTION - LOCATION: LOCATION: CHEST

## 2023-11-10 ASSESSMENT — PAIN SCALES - GENERAL
PAINLEVEL_OUTOF10: 0
PAINLEVEL_OUTOF10: 0
PAINLEVEL_OUTOF10: 6

## 2023-11-10 ASSESSMENT — PAIN DESCRIPTION - DESCRIPTORS: DESCRIPTORS: DISCOMFORT

## 2023-11-10 ASSESSMENT — LIFESTYLE VARIABLES
HOW OFTEN DO YOU HAVE A DRINK CONTAINING ALCOHOL: NEVER
HOW MANY STANDARD DRINKS CONTAINING ALCOHOL DO YOU HAVE ON A TYPICAL DAY: PATIENT DOES NOT DRINK

## 2023-11-10 ASSESSMENT — PAIN DESCRIPTION - ORIENTATION: ORIENTATION: LEFT

## 2023-11-10 NOTE — ED NOTES
Patient alert, oriented x4 and talking. Dr Ching Gravely at bedside speaking with the patient and her . Plan to intubate discussed with the patient and her .      Mary Jane Hatch RN  11/10/23 9430

## 2023-11-10 NOTE — ED NOTES
Patient back in V-Fib. Shock delivered at 200. CPR in progress.      Ariadna Quinones RN  11/10/23 5204

## 2023-11-10 NOTE — CONSULTS
Cardiology Consult Note    CC: CP  Reason for consult:  STEMI  Requesting MD:  Dr. Keke Rivas:      Date of  Admission: 11/10/2023 10:49 AM     Admission type:Emergency    Smith Guerrero is a 67 y.o. female admitted for Cardiac arrest ([address]) [I46.9]  STEMI (ST elevation myocardial infarction) ([address]) [I21.3]. Patient complains of upper chest pain radiating into her shoulder blades with nausea [facility] Her ekg was fine but she promptly coded with Vfib. CPR/Defib/intubation done. EKG follow up showed IWMI in progress. She was shocked total of four times and sent to us on IV Lidocaine and IV Amiodarone with Levophed. According to her family, she has complained of being more SOB. She has been taking Diclofenac and Voltaren cream for her Rt knee pain, which she thought was brought on initially from statin she was taking a high dose. Her other CRFs are positive for HTN, HLD, obesity. No DM       Patient Active Problem List    Diagnosis Date Noted    Cardiac arrest [facility]) 11/10/2023    Dizziness 03/05/2023      Jimbo Singh MD  Past Medical History:   Diagnosis Date    Hypertension     Thalassemia       History reviewed. No pertinent surgical history. Allergies   Allergen Reactions    Itraconazole      Other reaction(s): Unknown (comments)      History reviewed. No pertinent family history.    Current Facility-Administered Medications   Medication Dose Route Frequency    amiodarone (CORDARONE) 150 mg in dextrose 5 % 100 mL bolus  150 mg IntraVENous Once    etomidate (AMIDATE) 2 MG/ML injection        rocuronium (ZEMURON) 50 MG/5ML injection        succinylcholine (ANECTINE) 200 MG/10ML injection        potassium chloride 10 mEq/100 mL IVPB (Peripheral Line)  10 mEq IntraVENous Q1H    propofol infusion  5-50 mcg/kg/min IntraVENous Continuous    fentaNYL (SUBLIMAZE) injection 50 mcg  50 mcg IntraVENous Once    lidocaine 2000 mg in dextrose 5% 500 mL infusion  1 mg/min IntraVENous Continuous

## 2023-11-10 NOTE — ED NOTES
TRANSFER - OUT REPORT:    Verbal report given to Brittany Tee RN on Tom Costello  being transferred to [facility] Cath lab for urgent transfer    Report consisted of patient's Situation, Background, Assessment and   Recommendations(SBAR). Information from the following report(s) ED Encounter Summary was reviewed with the receiving nurse. [place] Fall Assessment:    Presents to emergency department  because of falls (Syncope, seizure, or loss of consciousness): No  Age > 70: Yes  Altered Mental Status, Intoxication with alcohol or substance confusion (Disorientation, impaired judgment, poor safety awaremess, or inability to follow instructions): No  Impaired Mobility: Ambulates or transfers with assistive devices or assistance; Unable to ambulate or transer.: No  Nursing Judgement: No          Lines:   Peripheral IV 11/10/23 Right Antecubital (Active)   Site Assessment Clean, dry & intact 11/10/23 1124   Line Status Flushed;Blood return noted 11/10/23 [facility] checked and tightened 11/10/23 1124   Phlebitis Assessment No symptoms 11/10/23 1124   Infiltration Assessment 0 11/10/23 1124   Alcohol Cap Used No 11/10/23 1124   Dressing Status Clean, dry & intact 11/10/23 1124   Dressing Type Transparent 11/10/23 1124   Dressing Intervention New 11/10/23 1124       Peripheral IV 11/10/23 Left Antecubital (Active)       Peripheral IV 11/10/23 Right; Anterior Hand (Active)   Site Assessment Clean, dry & intact 11/10/23 1152   Line Status Flushed;Blood return noted 11/10/23 [address] Conne[facility] checked and tightened 11/10/23 1152   Phlebitis Assessment No symptoms 11/10/23 1152   Infiltration Assessment 0 11/10/23 1152   Alcohol Cap Used No 11/10/23 1152   Dressing Status Clean, dry & intact 11/10/23 1152   Dressing Type Transparent 11/10/23 1152   Dressing Intervention New 11/10/23 1152        Opportunity for questions and clarification was provided.       Patient transported

## 2023-11-10 NOTE — ED PROVIDER NOTES
[address] [facility]      Pt Name: Opal Palacio  MRN: [id number]  [address] [facility] 1950  Date of evaluation: 11/10/2023  Provider: Mariella Vernon MD      HISTORY OF PRESENT ILLNESS      HPI  70-year-old female with past medical history of hypertension presenting to the emergency department due to nausea and vomiting. At around 4 AM patient started to feel unwell. She had been feeling nauseous all day and weak. Her  brought her to the ER and she started develop some pain in her upper back and upper chest.  She vomited once just prior to the ER. The pain started just prior to arrival.  No abdominal pain. No cough. No fevers. Most of the history is obtained from the patient's . Prior to my evaluation I was called to bedside as the patient acutely became unresponsive. Nursing Notes were reviewed. REVIEW OF SYSTEMS         Review of Systems  Unable to perform a complete review of systems secondary to the patient being in cardiac arrest.      PAST MEDICAL HISTORY     Past Medical History:   Diagnosis Date    Hypertension     Thalassemia          SURGICAL HISTORY     History reviewed. No pertinent surgical history. CURRENT MEDICATIONS       Current Discharge Medication List        CONTINUE these medications which have NOT CHANGED    Details   aspirin 81 MG EC tablet Take 1 tablet by mouth daily      atorvastatin (LIPITOR) 40 MG tablet Take 1 tablet by mouth daily      augmented betamethasone dipropionate (DIPROLENE-AF) 0.05 % cream ceived the following from [facility]: Outside name: augmented betamethasone dipropionate (DIPROLENE-AF) 0.05 % topical cream      Calcium Carbonate-Vitamin D 600-3. 125 MG-MCG TABS Take by mouth      chlorthalidone (HYGROTON) 25 MG tablet 0.5 tablets      clobetasol (TEMOVATE) 0.05 % cream Apply topically 2 times daily      ramipril (ALTACE) 10 MG capsule ceived the following from [facility]

## 2023-11-10 NOTE — ED NOTES
CPR initiated when patient became unresponsive. Shock delivered for pulseless V-Fib.  1 amp epi administered     Mikayla López RN  11/10/23 1106       Mikayla López RN  11/10/23 1106       Mikayla López RN  11/10/23 1108

## 2023-11-10 NOTE — ED NOTES
TRANSFER - OUT REPORT:    Bora Christian RN at [facility] ED informed that report has been called to Rachelle Gunn RN in the cath lab. Informed that [facility] is at the bedside and will transport patient.         Zoey Hobson RN  11/10/23 6299

## 2023-11-10 NOTE — ED NOTES
Patient back in V-Fib.  Patient shocked delivered at [address] [facility], [address] Celso Slater RN  11/10/23 2225

## 2023-11-10 NOTE — ED NOTES
Patient intubated with a 7.5. 22 at the lip. Bilateral lung sounds auscultated. SPO2 100%. + color change on CO2 detector.      Jovanna Ramsey RN  11/10/23 2876

## 2023-11-10 NOTE — ED TRIAGE NOTES
Patient arrives with c/o chest pain for the last few minutes, woke up at 4am not feeling right, got clammy, hot and cold.

## 2023-11-10 NOTE — ED NOTES
Patient gives verbal consent for intubation to Dr Milady Howell at this time.      Issa Thompson RN  11/10/23 2608

## 2023-11-11 ENCOUNTER — APPOINTMENT (OUTPATIENT)
Facility: HOSPITAL | Age: 73
DRG: 321 | End: 2023-11-11
Attending: INTERNAL MEDICINE
Payer: MEDICARE

## 2023-11-11 LAB
ALBUMIN SERPL-MCNC: 3.1 G/DL (ref 3.5–5)
ALBUMIN/GLOB SERPL: 0.9 (ref 1.1–2.2)
ALP SERPL-CCNC: 62 U/L (ref 45–117)
ALT SERPL-CCNC: 133 U/L (ref 12–78)
ANION GAP SERPL CALC-SCNC: 9 MMOL/L (ref 5–15)
AST SERPL-CCNC: 220 U/L (ref 15–37)
BILIRUB SERPL-MCNC: 1 MG/DL (ref 0.2–1)
BUN SERPL-MCNC: 13 MG/DL (ref 6–20)
BUN/CREAT SERPL: 18 (ref 12–20)
CALCIUM SERPL-MCNC: 8.4 MG/DL (ref 8.5–10.1)
CHLORIDE SERPL-SCNC: 99 MMOL/L (ref 97–108)
CO2 SERPL-SCNC: 22 MMOL/L (ref 21–32)
CREAT SERPL-MCNC: 0.74 MG/DL (ref 0.55–1.02)
EKG ATRIAL RATE: 78 BPM
EKG DIAGNOSIS: NORMAL
EKG P AXIS: 28 DEGREES
EKG P-R INTERVAL: 154 MS
EKG Q-T INTERVAL: 462 MS
EKG QRS DURATION: 142 MS
EKG QTC CALCULATION (BAZETT): 526 MS
EKG R AXIS: 4 DEGREES
EKG T AXIS: -7 DEGREES
EKG VENTRICULAR RATE: 78 BPM
ERYTHROCYTE [DISTWIDTH] IN BLOOD BY AUTOMATED COUNT: 16.6 % (ref 11.5–14.5)
GLOBULIN SER CALC-MCNC: 3.6 G/DL (ref 2–4)
GLUCOSE SERPL-MCNC: 137 MG/DL (ref 65–100)
HCT VFR BLD AUTO: 33.6 % (ref 35–47)
HGB BLD-MCNC: 10.1 G/DL (ref 11.5–16)
MAGNESIUM SERPL-MCNC: 1.8 MG/DL (ref 1.6–2.4)
MCH RBC QN AUTO: 18.4 PG (ref 26–34)
MCHC RBC AUTO-ENTMCNC: 30.1 G/DL (ref 30–36.5)
MCV RBC AUTO: 61.2 FL (ref 80–99)
NRBC # BLD: 0 K/UL (ref 0–0.01)
NRBC BLD-RTO: 0 PER 100 WBC
NT PRO BNP: 5383 PG/ML
PHOSPHATE SERPL-MCNC: 2.3 MG/DL (ref 2.6–4.7)
PLATELET # BLD AUTO: 230 K/UL (ref 150–400)
POTASSIUM SERPL-SCNC: 4.3 MMOL/L (ref 3.5–5.1)
PROT SERPL-MCNC: 6.7 G/DL (ref 6.4–8.2)
RBC # BLD AUTO: 5.49 M/UL (ref 3.8–5.2)
SODIUM SERPL-SCNC: 130 MMOL/L (ref 136–145)
TROPONIN I SERPL HS-MCNC: ABNORMAL NG/L (ref 0–51)
TROPONIN I SERPL HS-MCNC: ABNORMAL NG/L (ref 0–51)
WBC # BLD AUTO: 20.2 K/UL (ref 3.6–11)

## 2023-11-11 PROCEDURE — 97161 PT EVAL LOW COMPLEX 20 MIN: CPT

## 2023-11-11 PROCEDURE — 2580000003 HC RX 258: Performed by: INTERNAL MEDICINE

## 2023-11-11 PROCEDURE — 80053 COMPREHEN METABOLIC PANEL: CPT

## 2023-11-11 PROCEDURE — 85027 COMPLETE CBC AUTOMATED: CPT

## 2023-11-11 PROCEDURE — 027034Z DILATION OF CORONARY ARTERY, ONE ARTERY WITH DRUG-ELUTING INTRALUMINAL DEVICE, PERCUTANEOUS APPROACH: ICD-10-PCS | Performed by: INTERNAL MEDICINE

## 2023-11-11 PROCEDURE — 84100 ASSAY OF PHOSPHORUS: CPT

## 2023-11-11 PROCEDURE — 6370000000 HC RX 637 (ALT 250 FOR IP): Performed by: INTERNAL MEDICINE

## 2023-11-11 PROCEDURE — 83735 ASSAY OF MAGNESIUM: CPT

## 2023-11-11 PROCEDURE — 36415 COLL VENOUS BLD VENIPUNCTURE: CPT

## 2023-11-11 PROCEDURE — 5A12012 PERFORMANCE OF CARDIAC OUTPUT, SINGLE, MANUAL: ICD-10-PCS | Performed by: INTERNAL MEDICINE

## 2023-11-11 PROCEDURE — 97165 OT EVAL LOW COMPLEX 30 MIN: CPT

## 2023-11-11 PROCEDURE — 84484 ASSAY OF TROPONIN QUANT: CPT

## 2023-11-11 PROCEDURE — 97535 SELF CARE MNGMENT TRAINING: CPT

## 2023-11-11 PROCEDURE — 2060000000 HC ICU INTERMEDIATE R&B

## 2023-11-11 PROCEDURE — 97116 GAIT TRAINING THERAPY: CPT

## 2023-11-11 PROCEDURE — 93306 TTE W/DOPPLER COMPLETE: CPT

## 2023-11-11 PROCEDURE — 83880 ASSAY OF NATRIURETIC PEPTIDE: CPT

## 2023-11-11 RX ORDER — ENOXAPARIN SODIUM 100 MG/ML
30 INJECTION SUBCUTANEOUS DAILY
Status: DISCONTINUED | OUTPATIENT
Start: 2023-11-12 | End: 2023-11-11 | Stop reason: DRUGHIGH

## 2023-11-11 RX ORDER — POTASSIUM CHLORIDE 750 MG/1
40 TABLET, FILM COATED, EXTENDED RELEASE ORAL ONCE
Status: COMPLETED | OUTPATIENT
Start: 2023-11-11 | End: 2023-11-11

## 2023-11-11 RX ORDER — ENOXAPARIN SODIUM 100 MG/ML
40 INJECTION SUBCUTANEOUS DAILY
Status: DISCONTINUED | OUTPATIENT
Start: 2023-11-12 | End: 2023-11-12

## 2023-11-11 RX ADMIN — SODIUM CHLORIDE, PRESERVATIVE FREE 10 ML: 5 INJECTION INTRAVENOUS at 21:14

## 2023-11-11 RX ADMIN — ASPIRIN 81 MG CHEWABLE TABLET 81 MG: 81 TABLET CHEWABLE at 08:07

## 2023-11-11 RX ADMIN — SODIUM CHLORIDE, PRESERVATIVE FREE 10 ML: 5 INJECTION INTRAVENOUS at 08:07

## 2023-11-11 RX ADMIN — POTASSIUM CHLORIDE 40 MEQ: 750 TABLET, FILM COATED, EXTENDED RELEASE ORAL at 07:42

## 2023-11-11 RX ADMIN — CLOPIDOGREL BISULFATE 75 MG: 75 TABLET ORAL at 08:07

## 2023-11-11 ASSESSMENT — PAIN DESCRIPTION - ORIENTATION: ORIENTATION: MID

## 2023-11-11 ASSESSMENT — PAIN SCALES - GENERAL
PAINLEVEL_OUTOF10: 3
PAINLEVEL_OUTOF10: 0
PAINLEVEL_OUTOF10: 2

## 2023-11-11 ASSESSMENT — PAIN DESCRIPTION - LOCATION: LOCATION: CHEST

## 2023-11-11 ASSESSMENT — PAIN DESCRIPTION - DESCRIPTORS: DESCRIPTORS: SORE

## 2023-11-11 NOTE — H&P
Pump ED directly to CCL for Code STEMI. Pt c/o chest tightness, radiating to shoulder, nausea. Initial ECG ok, but went unresponsive. Code called, found in VF. 1x epi. DCCV x 3 w/ ROSC. ST elevation in inferior leads. VF again, DCCV again for total of 4x w/ ROSC. Amio, Lido gtt's started w/ Levo. Pt waking, but opted to intubate in ED. Txfr'd emergently to CCL. PCI, VALERY to Lcirc. Rec'd Plavix load, angiomax gtt initiated. Transferred to CCU intubated, sedated, but waking, following commands on minimal vent support. Overnight Events: HPI      POD:  Day of Surgery    S/P:   Procedure(s):  Left heart cath / coronary angiography  Percutaneous coronary intervention    Active Problem List:     Patient Active Problem List    Diagnosis Date Noted    Cardiac arrest [facility]) 11/10/2023    Dizziness 03/05/2023        Past Medical History:      has a past medical history of Hypertension and Thalassemia. Past Surgical History:      has no past surgical history on file. Home Medications:     Prior to Admission medications    Medication Sig Start Date End Date Taking? Authorizing Provider   aspirin 81 MG EC tablet Take 1 tablet by mouth daily 3/6/23   Automatic Reconciliation, Ar   atorvastatin (LIPITOR) 40 MG tablet Take 1 tablet by mouth daily 3/6/23   Automatic Reconciliation, Ar   augmented betamethasone dipropionate (DIPROLENE-AF) 0.05 % cream ceived the following from [facility]: Outside name: augmented betamethasone dipropionate (DIPROLENE-AF) 0.05 % topical cream 7/21/22   Automatic Reconciliation, Ar   Calcium Carbonate-Vitamin D 600-3. 125 MG-MCG TABS Take by mouth    Automatic Reconciliation, Ar   chlorthalidone (HYGROTON) 25 MG tablet 0.5 tablets 2/1/22   Automatic Reconciliation, Ar   clobetasol (TEMOVATE) 0.05 % cream Apply topically 2 times daily    Automatic Reconciliation, Ar   ramipril (ALTACE) 10 MG capsule ceived the following from [facility]: Outside name:
WBC 12.9 (*)     RBC 6.23 (*)     MCV 59.6 (*)     MCH 18.5 (*)     RDW 17.7 (*)     Nucleated RBCs 0.2 (*)     nRBC 0.02 (*)     Neutrophils % 79 (*)     Immature Granulocytes 1 (*)     Neutrophils Absolute 10.3 (*)     Absolute Immature Granulocyte 0.1 (*)     All other components within normal limits   COMPREHENSIVE METABOLIC PANEL - Abnormal; Notable for the following components:    Sodium 133 (*)     Potassium 3.2 (*)     Chloride 96 (*)     Glucose 142 (*)     Bun/Cre Ratio 22 (*)     All other components within normal limits   TROPONIN - Abnormal; Notable for the following components:    Troponin, High Sensitivity 513 (*)     All other components within normal limits   BRAIN NATRIURETIC PEPTIDE - Abnormal; Notable for the following components:    NT Pro- (*)     All other components within normal limits   TROPONIN - Abnormal; Notable for the following components:    Troponin, High Sensitivity 14,273 (*)     All other components within normal limits   TROPONIN - Abnormal; Notable for the following components:    Troponin, High Sensitivity 90,550 (*)     All other components within normal limits   BRAIN NATRIURETIC PEPTIDE - Abnormal; Notable for the following components:    NT Pro- (*)     All other components within normal limits   COMPREHENSIVE METABOLIC PANEL - Abnormal; Notable for the following components:    Sodium 131 (*)     Potassium 3.0 (*)     CO2 18 (*)     Glucose 213 (*)     Bun/Cre Ratio 26 (*)      (*)      (*)     All other components within normal limits   CBC - Abnormal; Notable for the following components:    WBC 27.7 (*)     RBC 6.03 (*)     Hemoglobin 11.1 (*)     MCV 60.2 (*)     MCH 18.4 (*)     RDW 18.0 (*)     Nucleated RBCs 0.2 (*)     nRBC 0.05 (*)     All other components within normal limits   COMPREHENSIVE METABOLIC PANEL - Abnormal; Notable for the following components:    Sodium 130 (*)     Glucose 137 (*)     Calcium 8.4 (*)      (*)

## 2023-11-12 VITALS
HEIGHT: 62 IN | BODY MASS INDEX: 32.05 KG/M2 | HEART RATE: 77 BPM | TEMPERATURE: 98 F | SYSTOLIC BLOOD PRESSURE: 107 MMHG | RESPIRATION RATE: 16 BRPM | WEIGHT: 174.16 LBS | OXYGEN SATURATION: 96 % | DIASTOLIC BLOOD PRESSURE: 50 MMHG

## 2023-11-12 PROBLEM — I25.10 CAD (CORONARY ARTERY DISEASE): Status: ACTIVE | Noted: 2023-11-12

## 2023-11-12 LAB
ALBUMIN SERPL-MCNC: 3 G/DL (ref 3.5–5)
ALBUMIN/GLOB SERPL: 0.9 (ref 1.1–2.2)
ALP SERPL-CCNC: 58 U/L (ref 45–117)
ALT SERPL-CCNC: 118 U/L (ref 12–78)
ANION GAP SERPL CALC-SCNC: 4 MMOL/L (ref 5–15)
AST SERPL-CCNC: 134 U/L (ref 15–37)
BILIRUB SERPL-MCNC: 0.6 MG/DL (ref 0.2–1)
BUN SERPL-MCNC: 14 MG/DL (ref 6–20)
BUN/CREAT SERPL: 19 (ref 12–20)
CALCIUM SERPL-MCNC: 8.1 MG/DL (ref 8.5–10.1)
CHLORIDE SERPL-SCNC: 107 MMOL/L (ref 97–108)
CO2 SERPL-SCNC: 27 MMOL/L (ref 21–32)
CREAT SERPL-MCNC: 0.75 MG/DL (ref 0.55–1.02)
ECHO AV PEAK GRADIENT: 15 MMHG
ECHO AV PEAK VELOCITY: 1.9 M/S
ECHO AV VELOCITY RATIO: 0.58
ECHO BSA: 1.88 M2
ECHO EST RA PRESSURE: 3 MMHG
ECHO LA DIAMETER INDEX: 1.87 CM/M2
ECHO LA DIAMETER: 3.4 CM
ECHO LV FRACTIONAL SHORTENING: 22 % (ref 28–44)
ECHO LV INTERNAL DIMENSION DIASTOLE INDEX: 1.98 CM/M2
ECHO LV INTERNAL DIMENSION DIASTOLIC: 3.6 CM (ref 3.9–5.3)
ECHO LV INTERNAL DIMENSION SYSTOLIC INDEX: 1.54 CM/M2
ECHO LV INTERNAL DIMENSION SYSTOLIC: 2.8 CM
ECHO LV IVSD: 0.8 CM (ref 0.6–0.9)
ECHO LV MASS 2D: 115.9 G (ref 67–162)
ECHO LV MASS INDEX 2D: 63.7 G/M2 (ref 43–95)
ECHO LV POSTERIOR WALL DIASTOLIC: 1.3 CM (ref 0.6–0.9)
ECHO LV RELATIVE WALL THICKNESS RATIO: 0.72
ECHO LVOT PEAK GRADIENT: 5 MMHG
ECHO LVOT PEAK VELOCITY: 1.1 M/S
ECHO MV E VELOCITY: 0.62 M/S
ECHO RIGHT VENTRICULAR SYSTOLIC PRESSURE (RVSP): 42 MMHG
ECHO RV TAPSE: 2.7 CM (ref 1.7–?)
ECHO TV REGURGITANT MAX VELOCITY: 3.12 M/S
ECHO TV REGURGITANT PEAK GRADIENT: 39 MMHG
ERYTHROCYTE [DISTWIDTH] IN BLOOD BY AUTOMATED COUNT: 16.2 % (ref 11.5–14.5)
GLOBULIN SER CALC-MCNC: 3.4 G/DL (ref 2–4)
GLUCOSE SERPL-MCNC: 144 MG/DL (ref 65–100)
HCT VFR BLD AUTO: 30.7 % (ref 35–47)
HGB BLD-MCNC: 9.4 G/DL (ref 11.5–16)
MAGNESIUM SERPL-MCNC: 2 MG/DL (ref 1.6–2.4)
MCH RBC QN AUTO: 18.4 PG (ref 26–34)
MCHC RBC AUTO-ENTMCNC: 30.6 G/DL (ref 30–36.5)
MCV RBC AUTO: 60 FL (ref 80–99)
NRBC # BLD: 0 K/UL (ref 0–0.01)
NRBC BLD-RTO: 0 PER 100 WBC
NT PRO BNP: 4215 PG/ML
PHOSPHATE SERPL-MCNC: 2 MG/DL (ref 2.6–4.7)
PLATELET # BLD AUTO: 209 K/UL (ref 150–400)
POTASSIUM SERPL-SCNC: 3.4 MMOL/L (ref 3.5–5.1)
PROT SERPL-MCNC: 6.4 G/DL (ref 6.4–8.2)
RBC # BLD AUTO: 5.12 M/UL (ref 3.8–5.2)
SODIUM SERPL-SCNC: 138 MMOL/L (ref 136–145)
TROPONIN I SERPL HS-MCNC: ABNORMAL NG/L (ref 0–51)
WBC # BLD AUTO: 14.3 K/UL (ref 3.6–11)

## 2023-11-12 PROCEDURE — 85027 COMPLETE CBC AUTOMATED: CPT

## 2023-11-12 PROCEDURE — 2580000003 HC RX 258: Performed by: INTERNAL MEDICINE

## 2023-11-12 PROCEDURE — 83880 ASSAY OF NATRIURETIC PEPTIDE: CPT

## 2023-11-12 PROCEDURE — 80053 COMPREHEN METABOLIC PANEL: CPT

## 2023-11-12 PROCEDURE — 84484 ASSAY OF TROPONIN QUANT: CPT

## 2023-11-12 PROCEDURE — 84100 ASSAY OF PHOSPHORUS: CPT

## 2023-11-12 PROCEDURE — 83735 ASSAY OF MAGNESIUM: CPT

## 2023-11-12 PROCEDURE — 6370000000 HC RX 637 (ALT 250 FOR IP): Performed by: INTERNAL MEDICINE

## 2023-11-12 PROCEDURE — 2700000000 HC OXYGEN THERAPY PER DAY

## 2023-11-12 PROCEDURE — 36415 COLL VENOUS BLD VENIPUNCTURE: CPT

## 2023-11-12 RX ORDER — METOPROLOL SUCCINATE 25 MG/1
12.5 TABLET, EXTENDED RELEASE ORAL DAILY
Qty: 30 TABLET | Refills: 0 | Status: SHIPPED | OUTPATIENT
Start: 2023-11-13

## 2023-11-12 RX ORDER — ASPIRIN 81 MG/1
81 TABLET, CHEWABLE ORAL DAILY
Qty: 30 TABLET | Refills: 0 | Status: SHIPPED | OUTPATIENT
Start: 2023-11-13

## 2023-11-12 RX ORDER — METOPROLOL SUCCINATE 25 MG/1
12.5 TABLET, EXTENDED RELEASE ORAL DAILY
Status: DISCONTINUED | OUTPATIENT
Start: 2023-11-12 | End: 2023-11-12 | Stop reason: HOSPADM

## 2023-11-12 RX ORDER — ROSUVASTATIN CALCIUM 20 MG/1
20 TABLET, COATED ORAL NIGHTLY
Qty: 30 TABLET | Refills: 0 | Status: SHIPPED | OUTPATIENT
Start: 2023-11-12

## 2023-11-12 RX ORDER — ROSUVASTATIN CALCIUM 10 MG/1
20 TABLET, COATED ORAL NIGHTLY
Status: DISCONTINUED | OUTPATIENT
Start: 2023-11-12 | End: 2023-11-12 | Stop reason: HOSPADM

## 2023-11-12 RX ORDER — CLOPIDOGREL BISULFATE 75 MG/1
75 TABLET ORAL DAILY
Qty: 30 TABLET | Refills: 0 | Status: SHIPPED | OUTPATIENT
Start: 2023-11-13

## 2023-11-12 RX ADMIN — CLOPIDOGREL BISULFATE 75 MG: 75 TABLET ORAL at 09:33

## 2023-11-12 RX ADMIN — METOPROLOL SUCCINATE 12.5 MG: 25 TABLET, EXTENDED RELEASE ORAL at 09:33

## 2023-11-12 RX ADMIN — ASPIRIN 81 MG CHEWABLE TABLET 81 MG: 81 TABLET CHEWABLE at 09:33

## 2023-11-12 RX ADMIN — SODIUM CHLORIDE, PRESERVATIVE FREE 10 ML: 5 INJECTION INTRAVENOUS at 09:32

## 2023-11-12 ASSESSMENT — PAIN SCALES - GENERAL
PAINLEVEL_OUTOF10: 0
PAINLEVEL_OUTOF10: 0

## 2023-11-12 NOTE — PLAN OF CARE
Problem: Discharge Planning  Goal: Discharge to home or other facility with appropriate resources  11/12/2023 1145 by Jodi Sharma RN  Outcome: Adequate for Discharge  11/12/2023 0133 by Samia Macias RN  Outcome: Progressing  Flowsheets (Taken 11/11/2023 1637 by Chelle Diana RN)  Discharge to home or other facility with appropriate resources: Identify barriers to discharge with patient and caregiver     Problem: Safety - Adult  Goal: Free from fall injury  Outcome: Adequate for Discharge     Problem: Pain  Goal: Verbalizes/displays adequate comfort level or baseline comfort level  11/12/2023 1145 by Jodi Sharma RN  Outcome: Adequate for Discharge  11/12/2023 0133 by Samia Macias RN  Outcome: Progressing  Flowsheets (Taken 11/12/2023 0133)  Verbalizes/displays adequate comfort level or baseline comfort level: Encourage patient to monitor pain and request assistance

## 2023-11-12 NOTE — DISCHARGE INSTRUCTIONS
You were diagnosed with cardiac arrest due to ventricular fibrillation and 2-vessel coronary artery disease with 1 stent placed to the L circumflex artery. You have been started on clopidogrel (Plavix), metoprolol (Lopressor), and rosuvastatin (Crestor) in addition to daily aspirin 81mg. Please take your medications as prescribed. Diet: heart healthy diet  Activity: as tolerated; no tub baths for at least 5 days.

## 2023-11-12 NOTE — PLAN OF CARE
Problem: Discharge Planning  Goal: Discharge to home or other facility with appropriate resources  Outcome: Progressing  Flowsheets (Taken 11/11/2023 1637 by Tracee Benson RN)  Discharge to home or other facility with appropriate resources: Identify barriers to discharge with patient and caregiver     Problem: Pain  Goal: Verbalizes/displays adequate comfort level or baseline comfort level  Outcome: Progressing  Flowsheets (Taken 11/12/2023 0133)  Verbalizes/displays adequate comfort level or baseline comfort level: Encourage patient to monitor pain and request assistance

## 2023-11-12 NOTE — DISCHARGE SUMMARY
Discharge Summary       PATIENT ID: Tom Costello  MRN: [id number]   YOB: 1950    DATE OF ADMISSION: 11/10/2023 10:49 AM    DATE OF DISCHARGE: 11/12/2023   PRIMARY CARE PROVIDER: Mu Claros MD     ATTENDING PHYSICIAN: Samreen Haddad MD  DISCHARGING PROVIDER: Samreen Haddad MD    To contact this individual call [phone] and ask the  to page. If unavailable ask to be transferred the Adult Hospitalist Department. CONSULTATIONS: IP CONSULT TO CARDIOLOGY    PROCEDURES/SURGERIES: Procedure(s):  Left heart cath / coronary angiography and Percutaneous coronary intervention on 11/10 by Dr Jacy Tenorio: Successful stent of mid LCX, 3.0x18 Oscar VALERY with excellent result. Two vessel CAD; LCX and D1    TTE 11/11 EF 45-50%, moderate hypokinesis of the basal inferolateral segment, mild MR    CXR 11/10 cardiomegaly and pulmonary edema    S/p intubation in ED on 11/10 and extubated on 11/10 post-cardiac catheterization    [address]:   66 yo woman with thalassemia, HTN, DLD, and arthritis presented to [facility] ED on 11/10 with chest pain, nausea, vomiting, became unresponsive, CPR was started and pt was found to be in pulseless V-fib requiring multiple shocks and rounds of epi. She was started on amiodarone gtt. Pt was intubated at [facility] and placed on vent. Code STEMI was called and pt was transferred emergently to [facility] cath lab for emergent LHC. Cardiology Dr Jacy Tenorio performed [address] on 11/10 and placed 1 VALERY to the mid-LCx; she was found to have 2-vessel disease of LCx and D1). Post-cath, pt was transferred to [facility] CCU on the vent, amiodarone gtt, lidocaine gtt, norepinephrine gtt. Initial troponin was 513 and peaked at 90,550 and trended down. Pt was extubated on 11/10 to 4L NC. TTE 11/11 showed EF 45-50%, moderate hypokinesis of the basal inferolateral segment, and mild MR. Pt was started on ASA, clopidogrel, and rosuvastatin.  PT/OT evaluated the patient and recommended home

## 2023-11-13 DIAGNOSIS — I21.3 MYOCARDIAL NECROSIS SYNDROME (HCC): Primary | ICD-10-CM

## 2023-11-13 DIAGNOSIS — Z95.5 STENTED CORONARY ARTERY: ICD-10-CM

## 2023-11-30 ENCOUNTER — HOSPITAL ENCOUNTER (OUTPATIENT)
Facility: HOSPITAL | Age: 73
Setting detail: RECURRING SERIES
End: 2023-11-30
Attending: INTERNAL MEDICINE
Payer: MEDICARE

## 2023-11-30 VITALS — BODY MASS INDEX: 32.2 KG/M2 | WEIGHT: 175 LBS | HEIGHT: 62 IN

## 2023-11-30 PROCEDURE — 93797 PHYS/QHP OP CAR RHAB WO ECG: CPT

## 2023-11-30 PROCEDURE — 93798 PHYS/QHP OP CAR RHAB W/ECG: CPT

## 2023-11-30 ASSESSMENT — EXERCISE STRESS TEST
PEAK_BP: 140/82
PEAK_RPE: 11
PEAK_HR: 89
PEAK_HR: 89
PEAK_BP: 140/82
PEAK_RPE: 11
PEAK_METS: 2.4
PEAK_BP: 140/82

## 2023-11-30 ASSESSMENT — PATIENT HEALTH QUESTIONNAIRE - PHQ9
SUM OF ALL RESPONSES TO PHQ QUESTIONS 1-9: 1
SUM OF ALL RESPONSES TO PHQ QUESTIONS 1-9: 1
3. TROUBLE FALLING OR STAYING ASLEEP: 1
10. IF YOU CHECKED OFF ANY PROBLEMS, HOW DIFFICULT HAVE THESE PROBLEMS MADE IT FOR YOU TO DO YOUR WORK, TAKE CARE OF THINGS AT HOME, OR GET ALONG WITH OTHER PEOPLE: 0
6. FEELING BAD ABOUT YOURSELF - OR THAT YOU ARE A FAILURE OR HAVE LET YOURSELF OR YOUR FAMILY DOWN: 0
7. TROUBLE CONCENTRATING ON THINGS, SUCH AS READING THE NEWSPAPER OR WATCHING TELEVISION: 0
8. MOVING OR SPEAKING SO SLOWLY THAT OTHER PEOPLE COULD HAVE NOTICED. OR THE OPPOSITE, BEING SO FIGETY OR RESTLESS THAT YOU HAVE BEEN MOVING AROUND A LOT MORE THAN USUAL: 0
2. FEELING DOWN, DEPRESSED OR HOPELESS: 0
1. LITTLE INTEREST OR PLEASURE IN DOING THINGS: 0
SUM OF ALL RESPONSES TO PHQ9 QUESTIONS 1 & 2: 0
9. THOUGHTS THAT YOU WOULD BE BETTER OFF DEAD, OR OF HURTING YOURSELF: 0
5. POOR APPETITE OR OVEREATING: 0
SUM OF ALL RESPONSES TO PHQ QUESTIONS 1-9: 1
SUM OF ALL RESPONSES TO PHQ QUESTIONS 1-9: 1
4. FEELING TIRED OR HAVING LITTLE ENERGY: 0

## 2023-11-30 ASSESSMENT — LIFESTYLE VARIABLES
SMOKELESS_TOBACCO: NO
ALCOHOL_AMOUNT: 1
ALCOHOL_TYPE: WINE
ALCOHOL_USE: SPECIAL

## 2023-11-30 ASSESSMENT — EJECTION FRACTION
EF_VALUE: 45
EF_VALUE: 45

## 2023-11-30 NOTE — CARDIO/PULMONARY
INTAKE APPOINTMENT NOTE  2023    NAME: Ezequiel Bojorquez : 1950 AGE: 68 y.o. GENDER: female    CARDIAC REHAB ADMITTING DIAGNOSIS: Myocardial necrosis syndrome (HCC) [I21.3]  Stented coronary artery [Z95.5]    REFERRING PHYSICIAN: Jolene Lee MD    MEDICAL HX:  Past Medical History:   Diagnosis Date    Arthritis     CAD (coronary artery disease) 11/10/2023    STEMI and stent    Hypertension     Thalassemia        LABS:     No results found for: \"HBA1C\", \"[id number]\"  Lab Results   Component Value Date/Time    CHOL 220 2023 09:50 AM    HDL 53 2023 09:50 AM         ANTHROPOMETRICS:      Ht Readings from Last 1 Encounters:   23 1.575 m (5' 2\")      Wt Readings from Last 1 Encounters:   23 79.4 kg (175 lb)        WAIST: 37       VISIT SUMMARY:    Ezequiel Bojorquez 68 y.o. presented to Cardiac Rehab for program orientation and 6 minute walk test today with a primary diagnosis of Myocardial necrosis syndrome ([address]) [I21.3]  Stented coronary artery [Z95.5]. EF is 45 % Cardiac risk factors include dyslipidemia, obesity, hypertension, stress. Ezequiel Bojorquez is  and lives with her . They have 3 supportive adult children. Patient was evaluated for depression using the PHQ-9 assessment tool with a result of 1 which is considered mild. The result was discussed with patient. Patient denied chest pain or SOB during 6 minute walk test and was in SR RBBB, rare PAC, rare PVC. Patient walked 0.17 mi at a speed of 1.8 mph and grade of 0 % for a final MET level of 2.4. Exercise prescription developed using exercise tolerance results and patient stated goals, to be supplemented with home exercise recommendations. Education manual given to patient and reviewed. Patient will attend cardiac rehab 2-3 times/week which will include both exercise and education sessions.     Patient states that she would like to accomplish the following by completion of the

## 2023-12-05 ENCOUNTER — HOSPITAL ENCOUNTER (OUTPATIENT)
Facility: HOSPITAL | Age: 73
Setting detail: RECURRING SERIES
Discharge: HOME | End: 2023-12-08
Attending: INTERNAL MEDICINE
Payer: MEDICARE

## 2023-12-05 ENCOUNTER — APPOINTMENT (OUTPATIENT)
Facility: HOSPITAL | Age: 73
End: 2023-12-05
Attending: INTERNAL MEDICINE
Payer: MEDICARE

## 2023-12-05 VITALS — BODY MASS INDEX: 32.01 KG/M2 | WEIGHT: 175 LBS

## 2023-12-05 PROCEDURE — 93797 PHYS/QHP OP CAR RHAB WO ECG: CPT

## 2023-12-05 PROCEDURE — 93798 PHYS/QHP OP CAR RHAB W/ECG: CPT

## 2023-12-05 ASSESSMENT — EXERCISE STRESS TEST
PEAK_METS: 2.4
PEAK_METS: 2.4
PEAK_RPE: 12

## 2023-12-07 ENCOUNTER — HOSPITAL ENCOUNTER (OUTPATIENT)
Facility: HOSPITAL | Age: 73
Setting detail: RECURRING SERIES
Discharge: HOME | End: 2023-12-10
Attending: INTERNAL MEDICINE
Payer: MEDICARE

## 2023-12-07 VITALS — BODY MASS INDEX: 31.92 KG/M2 | WEIGHT: 174.5 LBS

## 2023-12-07 PROCEDURE — 93798 PHYS/QHP OP CAR RHAB W/ECG: CPT

## 2023-12-07 ASSESSMENT — EXERCISE STRESS TEST
PEAK_METS: 2.6
PEAK_RPE: 12

## 2023-12-12 ENCOUNTER — APPOINTMENT (OUTPATIENT)
Facility: HOSPITAL | Age: 73
End: 2023-12-12
Attending: INTERNAL MEDICINE
Payer: MEDICARE

## 2023-12-12 ENCOUNTER — HOSPITAL ENCOUNTER (OUTPATIENT)
Facility: HOSPITAL | Age: 73
Setting detail: RECURRING SERIES
Discharge: HOME | End: 2023-12-15
Attending: INTERNAL MEDICINE
Payer: MEDICARE

## 2023-12-12 VITALS — WEIGHT: 171.4 LBS | BODY MASS INDEX: 31.35 KG/M2

## 2023-12-12 PROCEDURE — 93798 PHYS/QHP OP CAR RHAB W/ECG: CPT

## 2023-12-12 ASSESSMENT — EXERCISE STRESS TEST
PEAK_METS: 2.6
PEAK_RPE: 12

## 2023-12-19 ENCOUNTER — APPOINTMENT (OUTPATIENT)
Facility: HOSPITAL | Age: 73
End: 2023-12-19
Attending: INTERNAL MEDICINE
Payer: MEDICARE

## 2023-12-21 ENCOUNTER — HOSPITAL ENCOUNTER (OUTPATIENT)
Facility: HOSPITAL | Age: 73
Setting detail: RECURRING SERIES
Discharge: HOME | End: 2023-12-24
Attending: INTERNAL MEDICINE
Payer: MEDICARE

## 2023-12-21 VITALS — WEIGHT: 168.4 LBS | BODY MASS INDEX: 30.8 KG/M2

## 2023-12-21 PROCEDURE — 93798 PHYS/QHP OP CAR RHAB W/ECG: CPT

## 2023-12-26 ENCOUNTER — APPOINTMENT (OUTPATIENT)
Facility: HOSPITAL | Age: 73
End: 2023-12-26
Attending: INTERNAL MEDICINE
Payer: MEDICARE

## 2023-12-28 ENCOUNTER — HOSPITAL ENCOUNTER (OUTPATIENT)
Facility: HOSPITAL | Age: 73
Setting detail: RECURRING SERIES
Discharge: HOME | End: 2023-12-31
Attending: INTERNAL MEDICINE
Payer: MEDICARE

## 2023-12-28 VITALS — BODY MASS INDEX: 31.02 KG/M2 | WEIGHT: 169.6 LBS

## 2023-12-28 PROCEDURE — 93798 PHYS/QHP OP CAR RHAB W/ECG: CPT

## 2023-12-28 ASSESSMENT — EXERCISE STRESS TEST
PEAK_METS: 2.6
PEAK_RPE: 12

## 2024-01-02 ENCOUNTER — APPOINTMENT (OUTPATIENT)
Facility: HOSPITAL | Age: 74
End: 2024-01-02
Attending: INTERNAL MEDICINE
Payer: MEDICARE

## 2024-01-02 ENCOUNTER — HOSPITAL ENCOUNTER (OUTPATIENT)
Facility: HOSPITAL | Age: 74
Setting detail: RECURRING SERIES
Discharge: HOME | End: 2024-01-05
Attending: INTERNAL MEDICINE
Payer: MEDICARE

## 2024-01-02 VITALS — BODY MASS INDEX: 30.54 KG/M2 | WEIGHT: 167 LBS

## 2024-01-02 PROCEDURE — 93798 PHYS/QHP OP CAR RHAB W/ECG: CPT

## 2024-01-02 RX ORDER — EVOLOCUMAB 140 MG/ML
140 INJECTION, SOLUTION SUBCUTANEOUS
COMMUNITY

## 2024-01-02 ASSESSMENT — EXERCISE STRESS TEST
PEAK_METS: 2.8
PEAK_RPE: 12

## 2024-01-04 ENCOUNTER — HOSPITAL ENCOUNTER (OUTPATIENT)
Facility: HOSPITAL | Age: 74
Setting detail: RECURRING SERIES
Discharge: HOME | End: 2024-01-07
Attending: INTERNAL MEDICINE
Payer: MEDICARE

## 2024-01-04 VITALS — WEIGHT: 167.4 LBS | BODY MASS INDEX: 30.62 KG/M2

## 2024-01-04 PROCEDURE — 93798 PHYS/QHP OP CAR RHAB W/ECG: CPT

## 2024-01-04 ASSESSMENT — EXERCISE STRESS TEST
PEAK_METS: 2.8
PEAK_RPE: 12

## 2024-01-09 ENCOUNTER — APPOINTMENT (OUTPATIENT)
Facility: HOSPITAL | Age: 74
End: 2024-01-09
Attending: INTERNAL MEDICINE
Payer: MEDICARE

## 2024-01-09 ENCOUNTER — HOSPITAL ENCOUNTER (OUTPATIENT)
Facility: HOSPITAL | Age: 74
Setting detail: RECURRING SERIES
Discharge: HOME | End: 2024-01-12
Attending: INTERNAL MEDICINE
Payer: MEDICARE

## 2024-01-09 VITALS — WEIGHT: 166.2 LBS | BODY MASS INDEX: 30.4 KG/M2

## 2024-01-09 PROCEDURE — 93798 PHYS/QHP OP CAR RHAB W/ECG: CPT

## 2024-01-09 ASSESSMENT — EJECTION FRACTION: EF_VALUE: 45

## 2024-01-09 ASSESSMENT — LIFESTYLE VARIABLES
SMOKELESS_TOBACCO: NO
ALCOHOL_USE: SPECIAL
ALCOHOL_AMOUNT: 1
ALCOHOL_TYPE: WINE

## 2024-01-09 ASSESSMENT — EXERCISE STRESS TEST
PEAK_METS: 2.8
PEAK_RPE: 12

## 2024-01-11 ENCOUNTER — HOSPITAL ENCOUNTER (OUTPATIENT)
Facility: HOSPITAL | Age: 74
Setting detail: RECURRING SERIES
Discharge: HOME | End: 2024-01-14
Attending: INTERNAL MEDICINE
Payer: MEDICARE

## 2024-01-11 VITALS — BODY MASS INDEX: 30.18 KG/M2 | WEIGHT: 165 LBS

## 2024-01-11 PROCEDURE — 93798 PHYS/QHP OP CAR RHAB W/ECG: CPT

## 2024-01-11 ASSESSMENT — EXERCISE STRESS TEST
PEAK_METS: 2.9
PEAK_RPE: 12

## 2024-01-16 ENCOUNTER — HOSPITAL ENCOUNTER (OUTPATIENT)
Facility: HOSPITAL | Age: 74
Setting detail: RECURRING SERIES
Discharge: HOME | End: 2024-01-19
Attending: INTERNAL MEDICINE
Payer: MEDICARE

## 2024-01-16 ENCOUNTER — APPOINTMENT (OUTPATIENT)
Facility: HOSPITAL | Age: 74
End: 2024-01-16
Attending: INTERNAL MEDICINE
Payer: MEDICARE

## 2024-01-16 VITALS — BODY MASS INDEX: 29.74 KG/M2 | WEIGHT: 162.6 LBS

## 2024-01-16 PROCEDURE — 93798 PHYS/QHP OP CAR RHAB W/ECG: CPT

## 2024-01-16 ASSESSMENT — EXERCISE STRESS TEST
PEAK_RPE: 12
PEAK_METS: 2.9

## 2024-01-18 ENCOUNTER — HOSPITAL ENCOUNTER (OUTPATIENT)
Facility: HOSPITAL | Age: 74
Setting detail: RECURRING SERIES
Discharge: HOME | End: 2024-01-21
Attending: INTERNAL MEDICINE
Payer: MEDICARE

## 2024-01-18 VITALS — WEIGHT: 163 LBS | BODY MASS INDEX: 29.81 KG/M2

## 2024-01-18 PROCEDURE — 93798 PHYS/QHP OP CAR RHAB W/ECG: CPT

## 2024-01-18 ASSESSMENT — EXERCISE STRESS TEST
PEAK_METS: 2.9
PEAK_RPE: 12

## 2024-01-23 ENCOUNTER — HOSPITAL ENCOUNTER (OUTPATIENT)
Facility: HOSPITAL | Age: 74
Setting detail: RECURRING SERIES
Discharge: HOME | End: 2024-01-26
Attending: INTERNAL MEDICINE
Payer: MEDICARE

## 2024-01-23 VITALS — BODY MASS INDEX: 29.78 KG/M2 | WEIGHT: 162.8 LBS

## 2024-01-23 PROCEDURE — 93797 PHYS/QHP OP CAR RHAB WO ECG: CPT

## 2024-01-23 PROCEDURE — 93798 PHYS/QHP OP CAR RHAB W/ECG: CPT

## 2024-01-23 ASSESSMENT — EXERCISE STRESS TEST
PEAK_METS: 2.9
PEAK_RPE: 12

## 2024-01-25 ENCOUNTER — HOSPITAL ENCOUNTER (OUTPATIENT)
Facility: HOSPITAL | Age: 74
Setting detail: RECURRING SERIES
Discharge: HOME | End: 2024-01-28
Attending: INTERNAL MEDICINE
Payer: MEDICARE

## 2024-01-25 VITALS — BODY MASS INDEX: 29.74 KG/M2 | WEIGHT: 162.6 LBS

## 2024-01-25 PROCEDURE — 93798 PHYS/QHP OP CAR RHAB W/ECG: CPT

## 2024-01-25 ASSESSMENT — EXERCISE STRESS TEST
PEAK_RPE: 12
PEAK_METS: 2.9

## 2024-01-30 ENCOUNTER — HOSPITAL ENCOUNTER (OUTPATIENT)
Facility: HOSPITAL | Age: 74
Setting detail: RECURRING SERIES
Discharge: HOME | End: 2024-02-02
Attending: INTERNAL MEDICINE
Payer: MEDICARE

## 2024-01-30 VITALS — WEIGHT: 161.2 LBS | BODY MASS INDEX: 29.48 KG/M2

## 2024-01-30 PROCEDURE — 93798 PHYS/QHP OP CAR RHAB W/ECG: CPT

## 2024-01-30 ASSESSMENT — LIFESTYLE VARIABLES
ALCOHOL_TYPE: WINE
SMOKELESS_TOBACCO: NO
ALCOHOL_AMOUNT: 1
ALCOHOL_USE: SPECIAL

## 2024-01-30 ASSESSMENT — EXERCISE STRESS TEST
PEAK_METS: 2.9
PEAK_RPE: 12

## 2024-01-30 ASSESSMENT — EJECTION FRACTION: EF_VALUE: 45

## 2024-01-31 ENCOUNTER — HOSPITAL ENCOUNTER (OUTPATIENT)
Facility: HOSPITAL | Age: 74
Setting detail: RECURRING SERIES
Discharge: HOME | End: 2024-02-03
Attending: INTERNAL MEDICINE
Payer: MEDICARE

## 2024-01-31 VITALS — WEIGHT: 161.6 LBS | BODY MASS INDEX: 29.56 KG/M2

## 2024-01-31 PROCEDURE — 93798 PHYS/QHP OP CAR RHAB W/ECG: CPT

## 2024-01-31 ASSESSMENT — EXERCISE STRESS TEST
PEAK_METS: 3.1
PEAK_RPE: 12

## 2024-02-06 ENCOUNTER — HOSPITAL ENCOUNTER (OUTPATIENT)
Facility: HOSPITAL | Age: 74
Setting detail: RECURRING SERIES
Discharge: HOME | End: 2024-02-09
Attending: INTERNAL MEDICINE
Payer: MEDICARE

## 2024-02-06 VITALS — WEIGHT: 160.4 LBS | BODY MASS INDEX: 29.34 KG/M2

## 2024-02-06 PROCEDURE — 93797 PHYS/QHP OP CAR RHAB WO ECG: CPT

## 2024-02-06 PROCEDURE — 93798 PHYS/QHP OP CAR RHAB W/ECG: CPT

## 2024-02-06 ASSESSMENT — EXERCISE STRESS TEST
PEAK_METS: 3.1
PEAK_RPE: 12

## 2024-02-08 ENCOUNTER — HOSPITAL ENCOUNTER (OUTPATIENT)
Facility: HOSPITAL | Age: 74
Setting detail: RECURRING SERIES
Discharge: HOME | End: 2024-02-11
Attending: INTERNAL MEDICINE
Payer: MEDICARE

## 2024-02-08 VITALS — WEIGHT: 158.8 LBS | BODY MASS INDEX: 29.04 KG/M2

## 2024-02-08 PROCEDURE — 93798 PHYS/QHP OP CAR RHAB W/ECG: CPT

## 2024-02-13 ENCOUNTER — HOSPITAL ENCOUNTER (OUTPATIENT)
Facility: HOSPITAL | Age: 74
Setting detail: RECURRING SERIES
Discharge: HOME | End: 2024-02-16
Attending: INTERNAL MEDICINE
Payer: MEDICARE

## 2024-02-13 VITALS — WEIGHT: 157.6 LBS | BODY MASS INDEX: 28.83 KG/M2

## 2024-02-13 PROCEDURE — 93797 PHYS/QHP OP CAR RHAB WO ECG: CPT

## 2024-02-13 PROCEDURE — 93798 PHYS/QHP OP CAR RHAB W/ECG: CPT

## 2024-02-16 ENCOUNTER — APPOINTMENT (OUTPATIENT)
Facility: HOSPITAL | Age: 74
End: 2024-02-16
Attending: INTERNAL MEDICINE
Payer: MEDICARE

## 2024-02-19 ENCOUNTER — HOSPITAL ENCOUNTER (OUTPATIENT)
Facility: HOSPITAL | Age: 74
Setting detail: RECURRING SERIES
Discharge: HOME | End: 2024-02-22
Attending: INTERNAL MEDICINE
Payer: MEDICARE

## 2024-02-19 VITALS — BODY MASS INDEX: 28.86 KG/M2 | WEIGHT: 157.8 LBS

## 2024-02-19 PROCEDURE — 93798 PHYS/QHP OP CAR RHAB W/ECG: CPT

## 2024-02-19 ASSESSMENT — EXERCISE STRESS TEST
PEAK_METS: 3.1
PEAK_RPE: 12

## 2024-02-20 ENCOUNTER — HOSPITAL ENCOUNTER (OUTPATIENT)
Facility: HOSPITAL | Age: 74
Setting detail: RECURRING SERIES
Discharge: HOME | End: 2024-02-23
Attending: INTERNAL MEDICINE
Payer: MEDICARE

## 2024-02-20 VITALS — BODY MASS INDEX: 28.75 KG/M2 | WEIGHT: 157.2 LBS

## 2024-02-20 PROCEDURE — 93798 PHYS/QHP OP CAR RHAB W/ECG: CPT

## 2024-02-20 ASSESSMENT — LIFESTYLE VARIABLES
ALCOHOL_TYPE: WINE
ALCOHOL_USE: SPECIAL
SMOKELESS_TOBACCO: NO
ALCOHOL_AMOUNT: 1

## 2024-02-20 ASSESSMENT — EJECTION FRACTION: EF_VALUE: 45

## 2024-02-20 ASSESSMENT — EXERCISE STRESS TEST
PEAK_RPE: 12
PEAK_METS: 3.1

## 2024-02-22 ENCOUNTER — APPOINTMENT (OUTPATIENT)
Facility: HOSPITAL | Age: 74
End: 2024-02-22
Attending: INTERNAL MEDICINE
Payer: MEDICARE

## 2024-02-22 ENCOUNTER — HOSPITAL ENCOUNTER (OUTPATIENT)
Facility: HOSPITAL | Age: 74
Setting detail: RECURRING SERIES
Discharge: HOME | End: 2024-02-25
Attending: INTERNAL MEDICINE
Payer: MEDICARE

## 2024-02-22 VITALS — BODY MASS INDEX: 28.75 KG/M2 | WEIGHT: 157.2 LBS

## 2024-02-22 PROCEDURE — 93798 PHYS/QHP OP CAR RHAB W/ECG: CPT

## 2024-02-22 ASSESSMENT — EXERCISE STRESS TEST
PEAK_METS: 3.1
PEAK_RPE: 12

## 2024-02-27 ENCOUNTER — APPOINTMENT (OUTPATIENT)
Facility: HOSPITAL | Age: 74
End: 2024-02-27
Attending: INTERNAL MEDICINE
Payer: MEDICARE

## 2024-02-27 ENCOUNTER — HOSPITAL ENCOUNTER (OUTPATIENT)
Facility: HOSPITAL | Age: 74
Setting detail: RECURRING SERIES
Discharge: HOME | End: 2024-03-01
Attending: INTERNAL MEDICINE
Payer: MEDICARE

## 2024-02-27 VITALS — BODY MASS INDEX: 28.72 KG/M2 | WEIGHT: 157 LBS

## 2024-02-27 PROCEDURE — 93798 PHYS/QHP OP CAR RHAB W/ECG: CPT

## 2024-02-27 PROCEDURE — 93797 PHYS/QHP OP CAR RHAB WO ECG: CPT

## 2024-02-27 ASSESSMENT — EXERCISE STRESS TEST
PEAK_RPE: 12
PEAK_METS: 3.1

## 2024-02-29 ENCOUNTER — HOSPITAL ENCOUNTER (OUTPATIENT)
Facility: HOSPITAL | Age: 74
Setting detail: RECURRING SERIES
End: 2024-02-29
Attending: INTERNAL MEDICINE
Payer: MEDICARE

## 2024-02-29 ENCOUNTER — APPOINTMENT (OUTPATIENT)
Facility: HOSPITAL | Age: 74
End: 2024-02-29
Attending: INTERNAL MEDICINE
Payer: MEDICARE

## 2024-02-29 VITALS — BODY MASS INDEX: 28.79 KG/M2 | WEIGHT: 157.4 LBS

## 2024-02-29 PROCEDURE — 93798 PHYS/QHP OP CAR RHAB W/ECG: CPT

## 2024-02-29 ASSESSMENT — EXERCISE STRESS TEST
PEAK_RPE: 12
PEAK_METS: 3.2

## 2024-03-04 ASSESSMENT — PATIENT HEALTH QUESTIONNAIRE - PHQ9
9. THOUGHTS THAT YOU WOULD BE BETTER OFF DEAD, OR OF HURTING YOURSELF: 0
10. IF YOU CHECKED OFF ANY PROBLEMS, HOW DIFFICULT HAVE THESE PROBLEMS MADE IT FOR YOU TO DO YOUR WORK, TAKE CARE OF THINGS AT HOME, OR GET ALONG WITH OTHER PEOPLE: 0
SUM OF ALL RESPONSES TO PHQ9 QUESTIONS 1 & 2: 0
SUM OF ALL RESPONSES TO PHQ QUESTIONS 1-9: 1
1. LITTLE INTEREST OR PLEASURE IN DOING THINGS: 0
7. TROUBLE CONCENTRATING ON THINGS, SUCH AS READING THE NEWSPAPER OR WATCHING TELEVISION: 0
5. POOR APPETITE OR OVEREATING: 0
2. FEELING DOWN, DEPRESSED OR HOPELESS: 0
4. FEELING TIRED OR HAVING LITTLE ENERGY: 1
SUM OF ALL RESPONSES TO PHQ QUESTIONS 1-9: 1
8. MOVING OR SPEAKING SO SLOWLY THAT OTHER PEOPLE COULD HAVE NOTICED. OR THE OPPOSITE, BEING SO FIGETY OR RESTLESS THAT YOU HAVE BEEN MOVING AROUND A LOT MORE THAN USUAL: 0
SUM OF ALL RESPONSES TO PHQ QUESTIONS 1-9: 1
6. FEELING BAD ABOUT YOURSELF - OR THAT YOU ARE A FAILURE OR HAVE LET YOURSELF OR YOUR FAMILY DOWN: 0
SUM OF ALL RESPONSES TO PHQ QUESTIONS 1-9: 1
3. TROUBLE FALLING OR STAYING ASLEEP: 0

## 2024-03-05 ENCOUNTER — HOSPITAL ENCOUNTER (OUTPATIENT)
Facility: HOSPITAL | Age: 74
Setting detail: RECURRING SERIES
Discharge: HOME | End: 2024-03-08
Attending: INTERNAL MEDICINE
Payer: MEDICARE

## 2024-03-05 ENCOUNTER — APPOINTMENT (OUTPATIENT)
Facility: HOSPITAL | Age: 74
End: 2024-03-05
Attending: INTERNAL MEDICINE
Payer: MEDICARE

## 2024-03-05 VITALS — WEIGHT: 156 LBS | BODY MASS INDEX: 28.53 KG/M2

## 2024-03-05 PROCEDURE — 93798 PHYS/QHP OP CAR RHAB W/ECG: CPT

## 2024-03-05 ASSESSMENT — EXERCISE STRESS TEST
PEAK_RPE: 12
PEAK_METS: 3.4

## 2024-03-07 ENCOUNTER — HOSPITAL ENCOUNTER (OUTPATIENT)
Facility: HOSPITAL | Age: 74
Setting detail: RECURRING SERIES
Discharge: HOME | End: 2024-03-10
Attending: INTERNAL MEDICINE
Payer: MEDICARE

## 2024-03-07 ENCOUNTER — APPOINTMENT (OUTPATIENT)
Facility: HOSPITAL | Age: 74
End: 2024-03-07
Attending: INTERNAL MEDICINE
Payer: MEDICARE

## 2024-03-07 VITALS — BODY MASS INDEX: 28.57 KG/M2 | WEIGHT: 156.2 LBS

## 2024-03-07 PROCEDURE — 93798 PHYS/QHP OP CAR RHAB W/ECG: CPT

## 2024-03-07 ASSESSMENT — EXERCISE STRESS TEST
PEAK_BP: 155/77
PEAK_HR: 99
PEAK_BP: 155/77
PEAK_BP: 155/77
PEAK_RPE: 12
PEAK_HR: 99
PEAK_METS: 3.4
PEAK_RPE: 12

## 2024-03-07 ASSESSMENT — LIFESTYLE VARIABLES
SMOKELESS_TOBACCO: NO
ALCOHOL_USE: SPECIAL
ALCOHOL_TYPE: WINE
ALCOHOL_AMOUNT: 1

## 2024-03-07 NOTE — CARDIO/PULMONARY
DISCHARGE SUMMARY NOTE  3/7/2024      NAME: Rakel Willis : 1950 AGE: 73 y.o.  GENDER: female    CARDIAC REHAB ADMITTING DIAGNOSIS: Myocardial necrosis syndrome (HCC) [I21.3]  Stented coronary artery [Z95.5]    REFERRING PHYSICIAN: Ye Flores MD    MEDICAL HX:  Past Medical History:   Diagnosis Date    Arthritis     CAD (coronary artery disease) 11/10/2023    STEMI and stent    Hypertension     Thalassemia        LABS:     No results found for: \"HBA1C\", \"[id number]\"  Lab Results   Component Value Date/Time    CHOL 220 2023 09:50 AM    HDL 53 2023 09:50 AM         ANTHROPOMETRICS:      Ht Readings from Last 1 Encounters:   23 1.575 m (5' 2\")      Wt Readings from Last 1 Encounters:   24 70.9 kg (156 lb 3.2 oz)        WAIST:  34         PROGRAM SUMMARY:    Rakel Willis completed 35 sessions in phase II of the Cardiac Rehab program. Patient walked 0.24 mi at a speed of 2.6 mph and grade of 1 % for a final MET level of 3.4. Rakel Willis plans to continue exercising at home and a gym and has set revised goals that include 30 minutes of moderate-intensity aerobic activity 3-5 days weekly.      MET level increased from 2.4 at intake to 2.6 at discharge. Weight lost was 18.8 lbs. and 3 inches from her waist.  Rakel Willis will focus on diet and nutrition at home and has received individualized healthy nutrition recommendations from [facility] Cardiac Rehab staff.  She is aware of her cardiac disease risk factors and cardiac medications. All questions were addressed and discharge plans discussed. Rakel Willis verbalized understanding.      JODI SOLANO RN  3/7/2024

## 2024-03-12 ENCOUNTER — APPOINTMENT (OUTPATIENT)
Facility: HOSPITAL | Age: 74
End: 2024-03-12
Attending: INTERNAL MEDICINE
Payer: MEDICARE

## 2024-03-14 ENCOUNTER — APPOINTMENT (OUTPATIENT)
Facility: HOSPITAL | Age: 74
End: 2024-03-14
Attending: INTERNAL MEDICINE
Payer: MEDICARE

## 2024-07-12 ENCOUNTER — HOSPITAL ENCOUNTER (EMERGENCY)
Facility: HOSPITAL | Age: 74
Discharge: HOME | End: 2024-07-12
Attending: EMERGENCY MEDICINE
Payer: MEDICARE

## 2024-07-12 VITALS
WEIGHT: 137 LBS | OXYGEN SATURATION: 98 % | SYSTOLIC BLOOD PRESSURE: 137 MMHG | HEART RATE: 63 BPM | DIASTOLIC BLOOD PRESSURE: 65 MMHG | HEIGHT: 62 IN | RESPIRATION RATE: 15 BRPM | BODY MASS INDEX: 25.21 KG/M2 | TEMPERATURE: 98.3 F

## 2024-07-12 DIAGNOSIS — R42 LIGHTHEADEDNESS: Primary | ICD-10-CM

## 2024-07-12 DIAGNOSIS — R79.89 ELEVATED TROPONIN: ICD-10-CM

## 2024-07-12 LAB
ALBUMIN SERPL-MCNC: 4.2 G/DL (ref 3.5–5)
ALBUMIN/GLOB SERPL: 1.1 (ref 1.1–2.2)
ALP SERPL-CCNC: 75 U/L (ref 45–117)
ALT SERPL-CCNC: 20 U/L (ref 12–78)
ANION GAP SERPL CALC-SCNC: 9 MMOL/L (ref 5–15)
AST SERPL-CCNC: 17 U/L (ref 15–37)
BASOPHILS # BLD: 0.1 K/UL (ref 0–0.1)
BASOPHILS NFR BLD: 1 % (ref 0–1)
BILIRUB SERPL-MCNC: 0.6 MG/DL (ref 0.2–1)
BUN SERPL-MCNC: 22 MG/DL (ref 6–20)
BUN/CREAT SERPL: 28 (ref 12–20)
CALCIUM SERPL-MCNC: 9.5 MG/DL (ref 8.5–10.1)
CHLORIDE SERPL-SCNC: 99 MMOL/L (ref 97–108)
CO2 SERPL-SCNC: 29 MMOL/L (ref 21–32)
CREAT SERPL-MCNC: 0.78 MG/DL (ref 0.55–1.02)
DIFFERENTIAL METHOD BLD: ABNORMAL
EKG ATRIAL RATE: 59 BPM
EKG DIAGNOSIS: NORMAL
EKG P AXIS: 23 DEGREES
EKG P-R INTERVAL: 178 MS
EKG Q-T INTERVAL: 426 MS
EKG QRS DURATION: 94 MS
EKG QTC CALCULATION (BAZETT): 421 MS
EKG R AXIS: 1 DEGREES
EKG T AXIS: 10 DEGREES
EKG VENTRICULAR RATE: 59 BPM
EOSINOPHIL # BLD: 0.1 K/UL (ref 0–0.4)
EOSINOPHIL NFR BLD: 1 % (ref 0–7)
ERYTHROCYTE [DISTWIDTH] IN BLOOD BY AUTOMATED COUNT: 17.8 % (ref 11.5–14.5)
GLOBULIN SER CALC-MCNC: 3.7 G/DL (ref 2–4)
GLUCOSE SERPL-MCNC: 92 MG/DL (ref 65–100)
HCT VFR BLD AUTO: 39.6 % (ref 35–47)
HGB BLD-MCNC: 12.3 G/DL (ref 11.5–16)
IMM GRANULOCYTES # BLD AUTO: 0 K/UL (ref 0–0.04)
IMM GRANULOCYTES NFR BLD AUTO: 0 % (ref 0–0.5)
LYMPHOCYTES # BLD: 1.9 K/UL (ref 0.8–3.5)
LYMPHOCYTES NFR BLD: 15 % (ref 12–49)
MCH RBC QN AUTO: 18.5 PG (ref 26–34)
MCHC RBC AUTO-ENTMCNC: 31.1 G/DL (ref 30–36.5)
MCV RBC AUTO: 59.5 FL (ref 80–99)
MONOCYTES # BLD: 1.2 K/UL (ref 0–1)
MONOCYTES NFR BLD: 9 % (ref 5–13)
NEUTS SEG # BLD: 9.6 K/UL (ref 1.8–8)
NEUTS SEG NFR BLD: 74 % (ref 32–75)
NRBC # BLD: 0 K/UL (ref 0–0.01)
NRBC BLD-RTO: 0 PER 100 WBC
PLATELET # BLD AUTO: 267 K/UL (ref 150–400)
POTASSIUM SERPL-SCNC: 3.7 MMOL/L (ref 3.5–5.1)
PROT SERPL-MCNC: 7.9 G/DL (ref 6.4–8.2)
RBC # BLD AUTO: 6.65 M/UL (ref 3.8–5.2)
RBC MORPH BLD: ABNORMAL
SODIUM SERPL-SCNC: 137 MMOL/L (ref 136–145)
TROPONIN I SERPL HS-MCNC: 151 NG/L (ref 0–51)
TROPONIN I SERPL HS-MCNC: 171 NG/L (ref 0–51)
WBC # BLD AUTO: 12.9 K/UL (ref 3.6–11)

## 2024-07-12 PROCEDURE — 85025 COMPLETE CBC W/AUTO DIFF WBC: CPT

## 2024-07-12 PROCEDURE — 36415 COLL VENOUS BLD VENIPUNCTURE: CPT

## 2024-07-12 PROCEDURE — 99284 EMERGENCY DEPT VISIT MOD MDM: CPT

## 2024-07-12 PROCEDURE — 93010 ELECTROCARDIOGRAM REPORT: CPT | Performed by: SPECIALIST

## 2024-07-12 PROCEDURE — 84484 ASSAY OF TROPONIN QUANT: CPT

## 2024-07-12 PROCEDURE — 93005 ELECTROCARDIOGRAM TRACING: CPT | Performed by: EMERGENCY MEDICINE

## 2024-07-12 PROCEDURE — 80053 COMPREHEN METABOLIC PANEL: CPT

## 2024-07-12 RX ORDER — 0.9 % SODIUM CHLORIDE 0.9 %
1000 INTRAVENOUS SOLUTION INTRAVENOUS ONCE
Status: DISCONTINUED | OUTPATIENT
Start: 2024-07-12 | End: 2024-07-12

## 2024-07-12 ASSESSMENT — ENCOUNTER SYMPTOMS
COUGH: 0
WHEEZING: 0
ABDOMINAL PAIN: 0
NAUSEA: 0
VOMITING: 0
SHORTNESS OF BREATH: 0

## 2024-07-12 ASSESSMENT — PAIN SCALES - GENERAL: PAINLEVEL_OUTOF10: 0

## 2024-07-12 NOTE — ED TRIAGE NOTES
Patient arrives with c/o dizziness since she woke up this morning at 0530, went to be at 10pm. Reports not sleeping well. Patient reports she is on methylprednisolone for poison ivy and is concerned that the steroid could be doing this. Patient had an MI and coded in our department 11/2023.

## 2024-07-12 NOTE — ED PROVIDER NOTES
2:51 PM  Nesha Tom MD spoke with Dr. Flores, Consult for Cardiology. Discussed available diagnostic tests and clinical findings. He/She is in agreement with care plans as outlined. He/she agreeable with plan for dc. Likely chronically elevated trop.      [LA]   3092 Discussed results with patient and family.  Patient reports no subsequent episodes of lightheadedness in the emergency department.  Agreeable with plan for discharge and follow-up with primary care physician, cardiology. [LA]      ED Course User Index  [LA] Nesha Tom MD         FINAL IMPRESSION      1. Lightheadedness    2. Elevated troponin          DISPOSITION/PLAN   DISPOSITION Decision To Discharge 07/12/2024 02:52:02 PM          (Please note that portions of this note were completed with a voice recognition program.  Efforts were made to edit the dictations but occasionally words are mis-transcribed.)    Nesha Tom MD (electronically signed)  Attending Emergency Physician            Nesha Tom MD  07/12/24 [id number]

## 2024-07-12 NOTE — ED NOTES
Pt discharged in stable condition at this time. MD/MOOSE reviewed discharge instructions, prescriptions, and follow up with patient at bedside. Pt verbalized understanding and denies any needs or questions at this time.   Pt NAD on DC ambulatory with her  who will take her home

## (undated) DEVICE — KIT MED IMAG CNTRST AGNT W/ IOPAMIDOL REUSE

## (undated) DEVICE — ANGIO-SEAL VIP VASCULAR CLOSURE DEVICE: Brand: ANGIO-SEAL

## (undated) DEVICE — PINNACLE INTRODUCER SHEATH: Brand: PINNACLE

## (undated) DEVICE — PACK PROCEDURE SURG HRT CATH

## (undated) DEVICE — RUNTHROUGH NS EXTRA FLOPPY PTCA GUIDEWIRE: Brand: RUNTHROUGH

## (undated) DEVICE — WASTE KIT - ST MARY: Brand: MEDLINE INDUSTRIES, INC.

## (undated) DEVICE — ROYALSILK SURGICAL GOWN, L: Brand: CONVERTORS

## (undated) DEVICE — KIT AT-X65 ANGIOTOUCH HAND CONTROLLER

## (undated) DEVICE — KIT HND CTRL 3 W STPCOCK ROT END 54IN PREM HI PRSS TBNG AT

## (undated) DEVICE — ANGIOGRAPHY KIT

## (undated) DEVICE — CATHETER GUID 6FR DIA0.071IN SHFT NYL STD L JR 4 CRV ENH

## (undated) DEVICE — CATH BLLN ANGIO 2.50X12MM SC EUPHORA RX

## (undated) DEVICE — PADPRO DEFIBRILLATION/PACING/CARDIOVERSION/MONITORING ELECTRODES, ADULT/CHILD GREATER THAN 10 KG RADIOTRANSPARENT ELECTRODE, PHYSIO-CONTROL QUIK-COMBO (M) 60" (152 CM): Brand: PADPRO

## (undated) DEVICE — CUSTOM KT PTCA INFL DEV K05 00052M

## (undated) DEVICE — KIT MFLD ISOLATN NACL CNTRST PRT TBNG SPIK W/ PRSS TRNSDUC

## (undated) DEVICE — SALEM SUMP DUAL LUMEN STOMACH TUBE MULTI-FUNCTIONAL PORT WITH ENFIT CONNECTION: Brand: SALEM SUMP

## (undated) DEVICE — CATHETER GUID 6FR DIA0.071IN SHFT NYL STD JL 4 CRV ENH VIS